# Patient Record
Sex: MALE | Race: WHITE | NOT HISPANIC OR LATINO | Employment: OTHER | URBAN - METROPOLITAN AREA
[De-identification: names, ages, dates, MRNs, and addresses within clinical notes are randomized per-mention and may not be internally consistent; named-entity substitution may affect disease eponyms.]

---

## 2018-03-21 ENCOUNTER — TELEPHONE (OUTPATIENT)
Dept: MEDICAL GROUP | Facility: LAB | Age: 38
End: 2018-03-21

## 2018-03-21 NOTE — TELEPHONE ENCOUNTER
NEW PATIENT VISIT PRE-VISIT PLANNING    1.  [employer] Patient is checked in Patient Demographics? YES    2.  Immunizations were updated in Epic using WebIZ?: No WebIZ record       •  Web Iz Recommendations:     3.  Is this appointment scheduled as a Hospital Follow-Up? No    4.  Patient is due for the following Health Maintenance Topics:   Health Maintenance Due   Topic Date Due   • IMM DTaP/Tdap/Td Vaccine (1 - Tdap) 08/06/2000   • IMM INFLUENZA (1) 09/01/2017           5.  Reviewed/Updated the following with patient:   •   Preferred Pharmacy? NO       •   Preferred Lab? NO       •   Preferred Communication? NO       •   Allergies? NO       •   Medications? NO       •   Social History? NO       •   Family History (document living status of immediate family members and if + hx of cancer, diabetes, hypertension, hyperlipidemia, heart attack, stroke) NO    6.  Updated Care Team?       •   DME Company (gait device, O2, CPAP, etc.) NO       •   Other Specialists (eye doctor, derm, GYN, cardiology, endo, etc): NO    7.  Patient was informed to arrive 15 min prior to their scheduled appointment and bring in their medication bottles.

## 2018-03-22 ENCOUNTER — OFFICE VISIT (OUTPATIENT)
Dept: MEDICAL GROUP | Facility: LAB | Age: 38
End: 2018-03-22
Payer: OTHER GOVERNMENT

## 2018-03-22 VITALS
TEMPERATURE: 97.6 F | HEART RATE: 75 BPM | BODY MASS INDEX: 34.72 KG/M2 | WEIGHT: 248 LBS | RESPIRATION RATE: 12 BRPM | SYSTOLIC BLOOD PRESSURE: 132 MMHG | OXYGEN SATURATION: 93 % | DIASTOLIC BLOOD PRESSURE: 82 MMHG | HEIGHT: 71 IN

## 2018-03-22 DIAGNOSIS — Z13.6 SCREENING FOR CARDIOVASCULAR CONDITION: ICD-10-CM

## 2018-03-22 DIAGNOSIS — J06.9 UPPER RESPIRATORY TRACT INFECTION, UNSPECIFIED TYPE: ICD-10-CM

## 2018-03-22 DIAGNOSIS — Z13.1 SCREENING FOR DIABETES MELLITUS: ICD-10-CM

## 2018-03-22 DIAGNOSIS — Z00.00 PREVENTATIVE HEALTH CARE: ICD-10-CM

## 2018-03-22 PROCEDURE — 99385 PREV VISIT NEW AGE 18-39: CPT | Performed by: PHYSICIAN ASSISTANT

## 2018-03-22 RX ORDER — CHLORAL HYDRATE 500 MG
1000 CAPSULE ORAL DAILY
COMMUNITY
Start: 2018-03-22 | End: 2021-11-22

## 2018-03-22 RX ORDER — PETROLATUM,WHITE/LANOLIN
1 OINTMENT (GRAM) TOPICAL DAILY
COMMUNITY
Start: 2018-03-22 | End: 2021-11-22

## 2018-03-22 ASSESSMENT — PATIENT HEALTH QUESTIONNAIRE - PHQ9: CLINICAL INTERPRETATION OF PHQ2 SCORE: 0

## 2018-03-22 ASSESSMENT — PAIN SCALES - GENERAL: PAINLEVEL: NO PAIN

## 2018-06-18 ENCOUNTER — APPOINTMENT (OUTPATIENT)
Dept: MEDICAL GROUP | Facility: MEDICAL CENTER | Age: 38
End: 2018-06-18
Payer: OTHER GOVERNMENT

## 2018-06-29 ENCOUNTER — OFFICE VISIT (OUTPATIENT)
Dept: MEDICAL GROUP | Facility: MEDICAL CENTER | Age: 38
End: 2018-06-29
Payer: OTHER GOVERNMENT

## 2018-06-29 VITALS
TEMPERATURE: 97.5 F | OXYGEN SATURATION: 97 % | BODY MASS INDEX: 33.12 KG/M2 | WEIGHT: 236.55 LBS | DIASTOLIC BLOOD PRESSURE: 72 MMHG | HEIGHT: 71 IN | HEART RATE: 61 BPM | SYSTOLIC BLOOD PRESSURE: 100 MMHG

## 2018-06-29 DIAGNOSIS — Z84.81 FHX: BRCA2 GENE POSITIVE: ICD-10-CM

## 2018-06-29 DIAGNOSIS — E66.9 CLASS 1 OBESITY WITHOUT SERIOUS COMORBIDITY WITH BODY MASS INDEX (BMI) OF 32.0 TO 32.9 IN ADULT, UNSPECIFIED OBESITY TYPE: ICD-10-CM

## 2018-06-29 DIAGNOSIS — Z80.3 FAMILY HISTORY OF BREAST CANCER IN MOTHER: ICD-10-CM

## 2018-06-29 DIAGNOSIS — Z00.00 PREVENTATIVE HEALTH CARE: ICD-10-CM

## 2018-06-29 DIAGNOSIS — Z76.89 ENCOUNTER TO ESTABLISH CARE: ICD-10-CM

## 2018-06-29 PROBLEM — E66.811 CLASS 1 OBESITY WITHOUT SERIOUS COMORBIDITY IN ADULT: Status: ACTIVE | Noted: 2018-06-29

## 2018-06-29 PROCEDURE — 99385 PREV VISIT NEW AGE 18-39: CPT | Performed by: PHYSICIAN ASSISTANT

## 2018-06-29 NOTE — PROGRESS NOTES
"Subjective:   Brandon Polanco is a 37 y.o. male here today for establishing care for an annual physical.    Preventative health care  This is a 37-year-old male who is here today to establish care. No complaints today. No significant medical history. He is  with 2 children. Had a vasectomy. Lives in [place]. 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 Also recently had labs ordered in March but they were not refilled. His mother  of breast cancer in her 60s. She had breast cancer diagnosed in her 40s but it came back. She tested positive for BRCA2.       Current medicines (including changes today)  Current Outpatient Prescriptions   Medication Sig Dispense Refill   • Multiple Vitamins-Minerals (MENS ONE DAILY PO) Take  by mouth.     • Omega-3 Fatty Acids (FISH OIL) 1000 MG Cap capsule Take 1 Cap by mouth every day.     • Misc Natural Products (GLUCOS-CHONDROIT-MSM COMPLEX) Tab Take 1 Tab by mouth every day.       No current facility-administered medications for this visit.      He  has no past medical history of Allergy or ASTHMA.    Social History and Family History were reviewed and updated.    ROS   No chest pain, no shortness of breath, no abdominal pain and all other systems were reviewed and are negative.       Objective:     Blood pressure 100/72, pulse 61, temperature 36.4 °C (97.5 °F), height 1.803 m (5' 11\"), weight 107.3 kg (236 lb 8.9 oz), SpO2 97 %. Body mass index is 32.99 kg/m².   Physical Exam:  Constitutional: Alert, no distress.  Skin: Warm, dry, good turgor, no rashes in visible areas.  Eye: Equal, round and reactive, conjunctiva clear, lids normal.  ENMT: Lips without lesions, good dentition, oropharynx clear.  Neck: Trachea midline, no masses.   Lymph: No cervical or supraclavicular lymphadenopathy  Respiratory: Unlabored respiratory effort, lungs clear to auscultation, no wheezes, no ronchi.  Cardiovascular: Normal S1, S2, no murmur, no " edema.  Abdomen: Soft, non-tender, no masses.  Psych: Alert and oriented x3, normal affect and mood.        Assessment and Plan:   The following treatment plan was discussed    1. Preventative health care  Generally healthy male. Ordered labs fasting.  - LIPID PROFILE; Future  - HEMOGLOBIN A1C; Future  - COMP METABOLIC PANEL; Future    2. Family history of breast cancer in mother  Referred to genetics for testing.  - REFERRAL TO GENETICS    3. FHx: BRCA2 gene positive  Referred to genetics for testing.    4. Class 1 obesity without serious comorbidity with body mass index (BMI) of 32.0 to 32.9 in adult, unspecified obesity type  Continue exercising routinely and eating healthier.  - OBESITY COUNSELING (No Charge): Patient identified as having weight management issue.  Appropriate orders and counseling given.    5. Encounter to establish care      Followup: Return in about 1 year (around 6/29/2019), or if symptoms worsen or fail to improve.    Please note that this dictation was created using voice recognition software. I have made every reasonable attempt to correct obvious errors, but I expect that there are errors of grammar and possibly content that I did not discover before finalizing the note.

## 2018-06-29 NOTE — ASSESSMENT & PLAN NOTE
This is a 37-year-old male who is here today to establish care. No complaints today. No significant medical history. He is  with 2 children. Had a vasectomy. Lives in [place]. 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 Also recently had labs ordered in March but they were not refilled. His mother  of breast cancer in her 60s. She had breast cancer diagnosed in her 40s but it came back. She tested positive for BRCA2.

## 2018-07-24 LAB
ALBUMIN SERPL-MCNC: 4.2 G/DL (ref 3.5–5.5)
ALBUMIN/GLOB SERPL: 1.5 {RATIO} (ref 1.2–2.2)
ALP SERPL-CCNC: 59 IU/L (ref 39–117)
ALT SERPL-CCNC: 18 IU/L (ref 0–44)
AST SERPL-CCNC: 22 IU/L (ref 0–40)
BILIRUB SERPL-MCNC: 0.6 MG/DL (ref 0–1.2)
BUN SERPL-MCNC: 14 MG/DL (ref 6–20)
BUN/CREAT SERPL: 12 (ref 9–20)
CALCIUM SERPL-MCNC: 9.3 MG/DL (ref 8.7–10.2)
CHLORIDE SERPL-SCNC: 102 MMOL/L (ref 96–106)
CHOLEST SERPL-MCNC: 184 MG/DL (ref 100–199)
CO2 SERPL-SCNC: 22 MMOL/L (ref 20–29)
CREAT SERPL-MCNC: 1.13 MG/DL (ref 0.76–1.27)
GLOBULIN SER CALC-MCNC: 2.8 G/DL (ref 1.5–4.5)
GLUCOSE SERPL-MCNC: 78 MG/DL (ref 65–99)
HBA1C MFR BLD: 5.5 % (ref 4.8–5.6)
HDLC SERPL-MCNC: 37 MG/DL
IF AFRICAN AMERICAN  100797: 96 ML/MIN/1.73
IF NON AFRICAN AMER 100791: 83 ML/MIN/1.73
LABORATORY COMMENT REPORT: ABNORMAL
LDLC SERPL CALC-MCNC: 99 MG/DL (ref 0–99)
POTASSIUM SERPL-SCNC: 4.2 MMOL/L (ref 3.5–5.2)
PROT SERPL-MCNC: 7 G/DL (ref 6–8.5)
SODIUM SERPL-SCNC: 140 MMOL/L (ref 134–144)
TRIGL SERPL-MCNC: 242 MG/DL (ref 0–149)
VLDLC SERPL CALC-MCNC: 48 MG/DL (ref 5–40)

## 2018-11-21 ENCOUNTER — HOSPITAL ENCOUNTER (OUTPATIENT)
Dept: RADIOLOGY | Facility: MEDICAL CENTER | Age: 38
End: 2018-11-21
Attending: INTERNAL MEDICINE
Payer: OTHER GOVERNMENT

## 2018-11-21 ENCOUNTER — OFFICE VISIT (OUTPATIENT)
Dept: INTERNAL MEDICINE | Facility: MEDICAL CENTER | Age: 38
End: 2018-11-21
Payer: OTHER GOVERNMENT

## 2018-11-21 ENCOUNTER — HOSPITAL ENCOUNTER (OUTPATIENT)
Dept: LAB | Facility: MEDICAL CENTER | Age: 38
End: 2018-11-21
Attending: INTERNAL MEDICINE
Payer: OTHER GOVERNMENT

## 2018-11-21 VITALS
HEIGHT: 71 IN | BODY MASS INDEX: 33.15 KG/M2 | SYSTOLIC BLOOD PRESSURE: 126 MMHG | WEIGHT: 236.8 LBS | DIASTOLIC BLOOD PRESSURE: 80 MMHG | RESPIRATION RATE: 20 BRPM | OXYGEN SATURATION: 96 % | HEART RATE: 72 BPM | TEMPERATURE: 97 F

## 2018-11-21 DIAGNOSIS — Z23 NEED FOR VACCINATION: ICD-10-CM

## 2018-11-21 DIAGNOSIS — S99.921A FOOT INJURY, RIGHT, INITIAL ENCOUNTER: ICD-10-CM

## 2018-11-21 LAB — URATE SERPL-MCNC: 6.1 MG/DL (ref 2.5–8.3)

## 2018-11-21 PROCEDURE — 90686 IIV4 VACC NO PRSV 0.5 ML IM: CPT | Performed by: INTERNAL MEDICINE

## 2018-11-21 PROCEDURE — 90471 IMMUNIZATION ADMIN: CPT | Performed by: INTERNAL MEDICINE

## 2018-11-21 PROCEDURE — 36415 COLL VENOUS BLD VENIPUNCTURE: CPT

## 2018-11-21 PROCEDURE — 73630 X-RAY EXAM OF FOOT: CPT | Mod: RT

## 2018-11-21 PROCEDURE — 84550 ASSAY OF BLOOD/URIC ACID: CPT

## 2018-11-21 PROCEDURE — 99213 OFFICE O/P EST LOW 20 MIN: CPT | Mod: 25 | Performed by: INTERNAL MEDICINE

## 2018-11-21 ASSESSMENT — PAIN SCALES - GENERAL: PAINLEVEL: 8=MODERATE-SEVERE PAIN

## 2018-11-21 NOTE — NON-PROVIDER
"Brandon Polanco is a 38 y.o. male here for a non-provider visit for:   FLU    Reason for immunization: Annual Flu Vaccine  Immunization records indicate need for vaccine: Yes, confirmed with [place] WebIZ  Minimum interval has been met for this vaccine: Yes  ABN completed: Not Indicated    Order and dose verified by: GP  VIS Dated  08/07 was given to patient: Yes  All IAC Questionnaire questions were answered \"No.\"    Patient tolerated injection and no adverse effects were observed or reported: Yes    Pt scheduled for next dose in series: No  "

## 2018-11-21 NOTE — PATIENT INSTRUCTIONS
Foot Sprain  Introduction  A foot sprain is an injury to one of the strong bands of tissue (ligaments) that connect and support the many bones in your feet. The ligament can be stretched too much or it can tear. A tear can be either partial or complete. The severity of the sprain depends on how much of the ligament was damaged or torn.  What are the causes?  A foot sprain is usually caused by suddenly twisting or pivoting your foot.  What increases the risk?  This injury is more likely to occur in people who:  · Play a sport, such as basketball or football.  · Exercise or play a sport without warming up.  · Start a new workout or sport.  · Suddenly increase how long or hard they exercise or play a sport.  What are the signs or symptoms?  Symptoms of this condition start soon after an injury and include:  · Pain, especially in the arch of the foot.  · Bruising.  · Swelling.  · Inability to walk or use the foot to support body weight.  How is this diagnosed?  This condition is diagnosed with a medical history and physical exam. You may also have imaging tests, such as:  · X-rays to make sure there are no broken bones (fractures).  · MRI to see if the ligament has torn.  How is this treated?  Treatment varies depending on the severity of your sprain. Mild sprains can be treated with rest, ice, compression, and elevation (RICE). If your ligament is overstretched or partially torn, treatment usually involves keeping your foot in a fixed position (immobilization) for a period of time. To help you do this, your health care provider will apply a bandage, splint, or walking boot to keep your foot from moving until it heals. You may also be advised to use crutches or a scooter for a few weeks to avoid bearing weight on your foot while it is healing.  If your ligament is fully torn, you may need surgery to reconnect the ligament to the bone. After surgery, a cast or splint will be applied and will need to stay on your foot  while it heals.  Your health care provider may also suggest exercises or physical therapy to strengthen your foot.  Follow these instructions at home:  If You Have a Bandage, Splint, or Walking Boot:  · Wear it as directed by your health care provider. Remove it only as directed by your health care provider.  · Loosen the bandage, splint, or walking boot if your toes become numb and tingle, or if they turn cold and blue.  Bathing  · If your health care provider approves bathing and showering, cover the bandage or splint with a watertight plastic bag to protect it from water. Do not let the bandage or splint get wet.  Managing pain, stiffness, and swelling  · If directed, apply ice to the injured area:  ¨ Put ice in a plastic bag.  ¨ Place a towel between your skin and the bag.  ¨ Leave the ice on for 20 minutes, 2-3 times per day.  · Move your toes often to avoid stiffness and to lessen swelling.  · Raise (elevate) the injured area above the level of your heart while you are sitting or lying down.  Driving  · Do not drive or operate heavy machinery while taking pain medicine.  · Ask your health care provider when it is safe to drive if you have a bandage, splint, or walking boot on your foot.  Activity  · Rest as directed by your health care provider.  · Do not use the injured foot to support your body weight until your health care provider says that you can. Use crutches or other supportive devices as directed by your health care provider.  · Ask your health care provider what activities are safe for you. Gradually increase how much and how far you walk until your health care provider says it is safe to return to full activity.  · Do any exercise or physical therapy as directed by your health care provider.  General instructions  · If a splint was applied, do not put pressure on any part of it until it is fully hardened. This may take several hours.  · Take medicines only as directed by your health care provider.  These include over-the-counter medicines and prescription medicines.  · Keep all follow-up visits as directed by your health care provider. This is important.  · When you can walk without pain, wear supportive shoes that have stiff soles. Do not wear flip-flops, and do not walk barefoot.  Contact a health care provider if:  · Your pain is not controlled with medicine.  · Your bruising or swelling gets worse or does not get better with treatment.  · Your splint or walking boot is damaged.  Get help right away if:  · You develop severe numbness or tingling in your foot.  · Your foot turns blue, white, or gray, and it feels cold.  This information is not intended to replace advice given to you by your health care provider. Make sure you discuss any questions you have with your health care provider.  Document Released: 06/09/2003 Document Revised: 05/25/2017 Document Reviewed: 10/21/2015  © 2017 [employer]vier

## 2018-11-21 NOTE — LETTER
November 21, 2018        Brandon Polanco  [address]  [facility] [address]      To Whom so it may concern,    Mr. Taylor is seen in our office today for foot injury and will need off heavy duty exercise for a week. Recommend foot elevation and compression during this period.      If you have any questions or concerns, please don't hesitate to call.        Sincerely,        Michelle Brewster M.D.    Electronically Signed

## 2018-11-21 NOTE — PROGRESS NOTES
date  Chief Complaint   Patient presents with   • Foot Problem     swollen, red patches x 2 days        HISTORY OF PRESENT ILLNESS: Patient is a 38 y.o. male established patient who presents today for the following.      1. Need for vaccination  Interested in getting a flu shot today    2. Foot injury, right, initial encounter  Complaint of right foot injury 5 days ago .he went [place] driving at [facility] in [place] and on the sand tripped and fell with no initial injury or pain but later noticed right foot pain and swelling a day later.  Has been taking ibuprofen 800 mg TID and some relief.  Concerned if it is any fracture and so here for evaluation  Upon further review of history he does eat a lot of meat and has protein supplements for breakfast as he exercises a lot and works in the [employer].  He also drinks 1 glass of whiskey every night with dinner      History reviewed. No pertinent past medical history.    Patient Active Problem List    Diagnosis Date Noted   • Family history of breast cancer in mother 06/29/2018   • FHx: BRCA2 gene positive 06/29/2018   • Class 1 obesity without serious comorbidity in adult 06/29/2018       Allergies:Patient has no known allergies.    Current Outpatient Prescriptions   Medication Sig Dispense Refill   • Multiple Vitamins-Minerals (MENS ONE DAILY PO) Take  by mouth.     • Omega-3 Fatty Acids (FISH OIL) 1000 MG Cap capsule Take 1 Cap by mouth every day.     • Misc Natural Products (GLUCOS-CHONDROIT-MSM COMPLEX) Tab Take 1 Tab by mouth every day.       No current facility-administered medications for this visit.        Social History   Substance Use Topics   • Smoking status: Never Smoker   • Smokeless tobacco: Never Used   • Alcohol use 0.0 oz/week      Comment: 1 glass whiskey with dinner 6 d/wk       Family History   Problem Relation Age of Onset   • Cancer Mother         breast- met   • Heart Disease Maternal Grandmother    • Heart Disease Maternal Grandfather    • Heart  "Disease Paternal Grandmother    • Heart Disease Paternal Grandfather    • Diabetes Neg Hx    • Stroke Neg Hx          Review of Systems    Patient denies Fevers/chills/nausea/vomiting/chest pain/sob/blood in stools/black stools/blood in urine.all other systems are reviewed See HPI    Exam:  Blood pressure 126/80, pulse 72, temperature 36.1 °C (97 °F), temperature source Temporal, resp. rate 20, height 1.803 m (5' 10.98\"), weight 107.4 kg (236 lb 12.8 oz), SpO2 96 %. Body mass index is 33.04 kg/m².  Constitutional:  NAD, well appearing.  HEENT:   NC/AT  Extremities:  2+ DP and radial pulses bilaterally.  No c/c/e.  There is tenderness at first MTP joint on the right foot as well as the little toe.  He also has some pain midfoot on the medial aspect as well as at the lateral aspect.  There is no pain on the dorsal aspect of the foot.  Range of motion pretty good in the rest of the toes  Skin:  Warm and dry.    Neurologic: Alert & oriented x 3, CN II-XII grossly intact, strength and sensation grossly intact.  No focal deficits noted.  Psychiatric:  Affect normal, mood normal, judgment normal.    Assessment/Plan:     1. Need for vaccination  Flu shot given today in the office  - Influenza Vaccine Quad Injection >3Y (PF)    2. Foot injury, right, initial encounter  Patient seems to have had sprain during the fall.  We will get an x-ray just to make sure that there is no minor high hairline fractures secondary to his pain and swelling.  Discussed about rest, ice, compression, elevation.  Given his history of lot of meat as well as whiskey intake we talked about getting uric acid level and also may be consider switching to wine in place of whiskey and limit to only 1 glass as he is risk for gout attacks.    - DX-FOOT-COMPLETE 3+ RIGHT; Future  - URIC ACID; Future      All imaging results and lab results and consult notes are reviewed at this visit.  Followup: Return if symptoms worsen or fail to improve.    Please note " that this dictation was created using voice recognition software. I have made every reasonable attempt to correct obvious errors, but I expect that there are errors of grammar and possibly content that I did not discover before finalizing the note.

## 2019-01-29 NOTE — PROGRESS NOTES
HPI:   Brandon Polanco is a 37 y.o. male here for annual visit as new pt.     utd on all immunizations since he is in the [employer].   Diet: tried to eat healthy  Exercise: most days of the week  etoh- 1 whiskey drink 6 nights/wk with dinner.     For less than 2 weeks pt has nasal congestion, ear popping, frontal sinus pressure intermittently (none today), very slight cough  Denies fever, chills, headache, ear pain or pressure, facial pain, sore throat, shortness of breath, wheezing, chest pain, abdominal discomfort, nausea, vomiting, muscle aches  Has not been treating with anything  States it feels very mild and symptoms are intermittent.   Does get allergies although this is not typically is allergy season. Has used Flonase in the past.   No tobacco use or respiratory issues.  Does have 2 daughters and one of them was recently sick as well.    Current medicines (including changes today)  Current Outpatient Prescriptions   Medication Sig Dispense Refill   • Multiple Vitamins-Minerals (MENS ONE DAILY PO) Take  by mouth.     • Omega-3 Fatty Acids (FISH OIL) 1000 MG Cap capsule Take 1 Cap by mouth every day.     • Misc Natural Products (GLUCOS-CHONDROIT-MSM COMPLEX) Tab Take 1 Tab by mouth every day.       No current facility-administered medications for this visit.      He  has no past medical history of Allergy or ASTHMA.  He  has a past surgical history that includes eye surgery and vasectomy.  Social History   Substance Use Topics   • Smoking status: Never Smoker   • Smokeless tobacco: Never Used   • Alcohol use 0.0 oz/week      Comment: 1 glass whiskey with dinner 6 d/wk     Social History     Social History Narrative   • No narrative on file     Family History   Problem Relation Age of Onset   • Cancer Mother      breast- met   • Heart Disease Maternal Grandmother    • Heart Disease Maternal Grandfather    • Heart Disease Paternal Grandmother    • Heart Disease Paternal Grandfather    • Diabetes Neg Hx    •  "Stroke Neg Hx      Family Status   Relation Status   • Mother    • Father    • Brother Alive   • Maternal Grandmother    • Maternal Grandfather    • Paternal Grandmother    • Paternal Grandfather    • Daughter Alive   • Daughter Alive   • Neg Hx        ROS  Constitutional: Negative for fever, chills, weight loss  HENT: Negative for ear pain, nosebleeds, + congestion, neg sore throat and neck pain.   Eyes: Negative for blurred vision.   Respiratory: + cough, sputum production, Negative for shortness of breath and wheezing.   Cardiovascular: Negative for chest pain, palpitations, orthopnea and leg swelling.   Gastrointestinal: Negative for heartburn, nausea, vomiting and abdominal pain.   Genitourinary: Negative for dysuria, urgency and frequency.   Musculoskeletal: Negative for myalgias, back pain and joint pain.   Skin: Negative for rash and itching.   Neurological: Negative for dizziness, tingling, tremors, sensory change, focal weakness and headaches.   Endo/Heme/Allergies: Does not bruise/bleed easily.   Psychiatric/Behavioral: Negative for depression, anxiety, or memory loss.   All other systems reviewed and are negative except as in HPI.     Objective:     Blood pressure 132/82, pulse 75, temperature 36.4 °C (97.6 °F), resp. rate 12, height 1.803 m (5' 11\"), weight 112.5 kg (248 lb), SpO2 93 %. Body mass index is 34.59 kg/m².  Physical Exam:    Constitutional: Alert, no distress.  Skin: Warm, dry, good turgor, no rashes in visible areas.  Eye: PERRLA, conjunctiva clear, lids normal.  ENMT: TM pearly gray, nares patent, nasal terminates just slightly edematous with clear mucus noted. Lips without lesions, good dentition, oropharynx with very mild erythema, no exudates or lesions, clear postnasal drip mild.  Neck: Trachea midline, no masses, no thyromegaly.  Respiratory: Unlabored respiratory effort, lungs clear to auscultation, no wheezes, no ronchi.  Cardiovascular: Normal S1, S2, no murmur, no " edema.  Abdomen: Soft, non-tender, no masses, no hepatosplenomegaly.  Psych: Alert and oriented x3, normal affect and mood.    Assessment and Plan:   The following treatment plan was discussed     1. Preventative health care  utd on immunizations, pt will bring in record  Labs ordered, will call for results    2. Screening for cardiovascular condition  Labs ordered, will call for results  - LIPID PROFILE; Future    3. Screening for diabetes mellitus  Labs ordered, will call for results  - COMP METABOLIC PANEL; Future    4. BMI 34.0-34.9,adult  continue with healthy lifestyle, recommend a little more cardio.   - Patient identified as having weight management issue.  Appropriate orders and counseling given.    5. URI  Viral most likely, recommend nasal saline spray, Flonase, Mucinex with lots of water, rest.   Follow back up or call if symptoms do not improve or worsen    Records requested.  Followup: Return in about 1 year (around 3/22/2019) for annual .           Please note that this dictation was created using voice recognition software. I have made every reasonable attempt to correct obvious errors, but I expect that there are errors of grammar and possibly content that I did not discover before finalizing the note.   No

## 2021-09-24 ENCOUNTER — OFFICE VISIT (OUTPATIENT)
Dept: MEDICAL GROUP | Facility: MEDICAL CENTER | Age: 41
End: 2021-09-24
Payer: OTHER GOVERNMENT

## 2021-09-24 VITALS
WEIGHT: 239.2 LBS | OXYGEN SATURATION: 97 % | DIASTOLIC BLOOD PRESSURE: 82 MMHG | SYSTOLIC BLOOD PRESSURE: 114 MMHG | BODY MASS INDEX: 34.24 KG/M2 | HEART RATE: 70 BPM | HEIGHT: 70 IN | TEMPERATURE: 97.8 F

## 2021-09-24 DIAGNOSIS — Z00.00 PREVENTATIVE HEALTH CARE: ICD-10-CM

## 2021-09-24 DIAGNOSIS — J01.10 ACUTE NON-RECURRENT FRONTAL SINUSITIS: ICD-10-CM

## 2021-09-24 DIAGNOSIS — E66.9 OBESITY (BMI 30-39.9): ICD-10-CM

## 2021-09-24 PROBLEM — E66.811 CLASS 1 OBESITY WITHOUT SERIOUS COMORBIDITY IN ADULT: Status: RESOLVED | Noted: 2018-06-29 | Resolved: 2021-09-24

## 2021-09-24 PROBLEM — J32.1 FRONTAL SINUSITIS: Status: ACTIVE | Noted: 2021-09-24

## 2021-09-24 PROCEDURE — 99214 OFFICE O/P EST MOD 30 MIN: CPT | Performed by: PHYSICIAN ASSISTANT

## 2021-09-24 RX ORDER — SULFAMETHOXAZOLE AND TRIMETHOPRIM 800; 160 MG/1; MG/1
1 TABLET ORAL 2 TIMES DAILY
Qty: 20 TABLET | Refills: 0 | Status: SHIPPED
Start: 2021-09-24 | End: 2021-11-22

## 2021-09-24 ASSESSMENT — PATIENT HEALTH QUESTIONNAIRE - PHQ9: CLINICAL INTERPRETATION OF PHQ2 SCORE: 0

## 2021-09-24 NOTE — PROGRESS NOTES
"Subjective:   Brandon Polanco is a 41 y.o. male here today for acute sinusitis.    Frontal sinusitis  This is a pleasant 41-year-old male complains of an approximate 10-day history of sinus congestion and drainage.  Symptoms began with left ear pain.  Then moved into his sinuses.  He is blowing brownish chunks of mucus.  Denies any fevers.  Wife was sick the week before.  No known Covid exposure.  He has been taking over-the-counter cold medications as well as Benadryl.  No improvement in his symptoms.  Slight coughing.  Have not seen in quite some time so he is due for labs.       Current medicines (including changes today)  Current Outpatient Medications   Medication Sig Dispense Refill   • sulfamethoxazole-trimethoprim (BACTRIM DS) 800-160 MG tablet Take 1 Tablet by mouth 2 times a day. 20 Tablet 0   • Multiple Vitamins-Minerals (MENS ONE DAILY PO) Take  by mouth.     • Omega-3 Fatty Acids (FISH OIL) 1000 MG Cap capsule Take 1 Cap by mouth every day.     • Misc Natural Products (GLUCOS-CHONDROIT-MSM COMPLEX) Tab Take 1 Tab by mouth every day.       No current facility-administered medications for this visit.     He  has no past medical history of Allergy or ASTHMA.    Social History and Family History were reviewed and updated.    ROS   No chest pain, no shortness of breath, no abdominal pain and all other systems were reviewed and are negative.       Objective:     /82   Pulse 70   Temp 36.6 °C (97.8 °F) (Temporal)   Ht 1.778 m (5' 10\")   Wt 109 kg (239 lb 3.2 oz)   SpO2 97%  Body mass index is 34.32 kg/m².   Physical Exam:  Constitutional: Alert, no distress.  Skin: Warm, dry, good turgor, no rashes in visible areas.  Eye: Equal, round and reactive, conjunctiva clear, lids normal.  ENMT: Lips without lesions, good dentition, oropharynx clear.  Left TM is clear.  Neck: Trachea midline, no masses.   Lymph: No cervical or supraclavicular lymphadenopathy  Respiratory: Unlabored respiratory effort, lungs " appear clear, no wheezes.  Cardiovascular: Regular rate and rhythm.  Psych: Alert and oriented x3, normal affect and mood.        Assessment and Plan:   The following treatment plan was discussed    1. Acute non-recurrent frontal sinusitis  , New onset condition.  Discussed likely viral process.  May continue over-the-counter cold medications.  Advise possibly nasal steroid spray like Nasacort.  Sent over Bactrim which may reduce bacterial properties.  Push fluids.  Hopefully his symptoms will improve in 5 to 10 days.  - sulfamethoxazole-trimethoprim (BACTRIM DS) 800-160 MG tablet; Take 1 Tablet by mouth 2 times a day.  Dispense: 20 Tablet; Refill: 0    2. Preventative health care  Ordered labs fasting.  He will be contacted with the results.  - TSH WITH REFLEX TO FT4; Future  - Comp Metabolic Panel; Future  - HEMOGLOBIN A1C; Future  - Lipid Profile; Future  - CBC WITH DIFFERENTIAL; Future  - VITAMIN D,25 HYDROXY; Future         Followup: Return if symptoms worsen or fail to improve.    Please note that this dictation was created using voice recognition software. I have made every reasonable attempt to correct obvious errors, but I expect that there are errors of grammar and possibly content that I did not discover before finalizing the note.

## 2021-09-24 NOTE — ASSESSMENT & PLAN NOTE
This is a pleasant 41-year-old male complains of an approximate 10-day history of sinus congestion and drainage.  Symptoms began with left ear pain.  Then moved into his sinuses.  He is blowing brownish chunks of mucus.  Denies any fevers.  Wife was sick the week before.  No known Covid exposure.  He has been taking over-the-counter cold medications as well as Benadryl.  No improvement in his symptoms.  Slight coughing.  Have not seen in quite some time so he is due for labs.

## 2021-11-18 ENCOUNTER — HOSPITAL ENCOUNTER (OUTPATIENT)
Dept: LAB | Facility: MEDICAL CENTER | Age: 41
End: 2021-11-18
Attending: PHYSICIAN ASSISTANT
Payer: OTHER GOVERNMENT

## 2021-11-18 DIAGNOSIS — Z00.00 PREVENTATIVE HEALTH CARE: ICD-10-CM

## 2021-11-18 LAB
ALBUMIN SERPL BCP-MCNC: 4.6 G/DL (ref 3.2–4.9)
ALBUMIN/GLOB SERPL: 1.6 G/DL
ALP SERPL-CCNC: 69 U/L (ref 30–99)
ALT SERPL-CCNC: 23 U/L (ref 2–50)
ANION GAP SERPL CALC-SCNC: 13 MMOL/L (ref 7–16)
AST SERPL-CCNC: 18 U/L (ref 12–45)
BASOPHILS # BLD AUTO: 1 % (ref 0–1.8)
BASOPHILS # BLD: 0.08 K/UL (ref 0–0.12)
BILIRUB SERPL-MCNC: 0.4 MG/DL (ref 0.1–1.5)
BUN SERPL-MCNC: 20 MG/DL (ref 8–22)
CALCIUM SERPL-MCNC: 9.6 MG/DL (ref 8.5–10.5)
CHLORIDE SERPL-SCNC: 105 MMOL/L (ref 96–112)
CHOLEST SERPL-MCNC: 201 MG/DL (ref 100–199)
CO2 SERPL-SCNC: 23 MMOL/L (ref 20–33)
CREAT SERPL-MCNC: 1.23 MG/DL (ref 0.5–1.4)
EOSINOPHIL # BLD AUTO: 0.21 K/UL (ref 0–0.51)
EOSINOPHIL NFR BLD: 2.5 % (ref 0–6.9)
ERYTHROCYTE [DISTWIDTH] IN BLOOD BY AUTOMATED COUNT: 39.4 FL (ref 35.9–50)
EST. AVERAGE GLUCOSE BLD GHB EST-MCNC: 117 MG/DL
FASTING STATUS PATIENT QL REPORTED: NORMAL
GLOBULIN SER CALC-MCNC: 2.9 G/DL (ref 1.9–3.5)
GLUCOSE SERPL-MCNC: 95 MG/DL (ref 65–99)
HBA1C MFR BLD: 5.7 % (ref 4–5.6)
HCT VFR BLD AUTO: 49.7 % (ref 42–52)
HDLC SERPL-MCNC: 42 MG/DL
HGB BLD-MCNC: 16.5 G/DL (ref 14–18)
IMM GRANULOCYTES # BLD AUTO: 0.01 K/UL (ref 0–0.11)
IMM GRANULOCYTES NFR BLD AUTO: 0.1 % (ref 0–0.9)
LDLC SERPL CALC-MCNC: 138 MG/DL
LYMPHOCYTES # BLD AUTO: 2.58 K/UL (ref 1–4.8)
LYMPHOCYTES NFR BLD: 31.3 % (ref 22–41)
MCH RBC QN AUTO: 29.8 PG (ref 27–33)
MCHC RBC AUTO-ENTMCNC: 33.2 G/DL (ref 33.7–35.3)
MCV RBC AUTO: 89.7 FL (ref 81.4–97.8)
MONOCYTES # BLD AUTO: 1.02 K/UL (ref 0–0.85)
MONOCYTES NFR BLD AUTO: 12.4 % (ref 0–13.4)
NEUTROPHILS # BLD AUTO: 4.34 K/UL (ref 1.82–7.42)
NEUTROPHILS NFR BLD: 52.7 % (ref 44–72)
NRBC # BLD AUTO: 0 K/UL
NRBC BLD-RTO: 0 /100 WBC
PLATELET # BLD AUTO: 201 K/UL (ref 164–446)
PMV BLD AUTO: 13.3 FL (ref 9–12.9)
POTASSIUM SERPL-SCNC: 4.6 MMOL/L (ref 3.6–5.5)
PROT SERPL-MCNC: 7.5 G/DL (ref 6–8.2)
RBC # BLD AUTO: 5.54 M/UL (ref 4.7–6.1)
SODIUM SERPL-SCNC: 141 MMOL/L (ref 135–145)
TRIGL SERPL-MCNC: 107 MG/DL (ref 0–149)
TSH SERPL DL<=0.005 MIU/L-ACNC: 3.23 UIU/ML (ref 0.38–5.33)
WBC # BLD AUTO: 8.2 K/UL (ref 4.8–10.8)

## 2021-11-18 PROCEDURE — 80061 LIPID PANEL: CPT

## 2021-11-18 PROCEDURE — 36415 COLL VENOUS BLD VENIPUNCTURE: CPT

## 2021-11-18 PROCEDURE — 85025 COMPLETE CBC W/AUTO DIFF WBC: CPT

## 2021-11-18 PROCEDURE — 84443 ASSAY THYROID STIM HORMONE: CPT

## 2021-11-18 PROCEDURE — 83036 HEMOGLOBIN GLYCOSYLATED A1C: CPT

## 2021-11-18 PROCEDURE — 80053 COMPREHEN METABOLIC PANEL: CPT

## 2021-11-18 PROCEDURE — 82306 VITAMIN D 25 HYDROXY: CPT

## 2021-11-20 LAB — 25(OH)D3 SERPL-MCNC: 23 NG/ML (ref 30–80)

## 2021-11-22 ENCOUNTER — APPOINTMENT (OUTPATIENT)
Dept: RADIOLOGY | Facility: MEDICAL CENTER | Age: 41
DRG: 605 | End: 2021-11-22
Attending: EMERGENCY MEDICINE
Payer: OTHER GOVERNMENT

## 2021-11-22 ENCOUNTER — HOSPITAL ENCOUNTER (INPATIENT)
Facility: MEDICAL CENTER | Age: 41
LOS: 2 days | DRG: 605 | End: 2021-11-24
Attending: EMERGENCY MEDICINE | Admitting: INTERNAL MEDICINE
Payer: OTHER GOVERNMENT

## 2021-11-22 ENCOUNTER — OFFICE VISIT (OUTPATIENT)
Dept: URGENT CARE | Facility: CLINIC | Age: 41
End: 2021-11-22
Payer: OTHER GOVERNMENT

## 2021-11-22 VITALS
HEIGHT: 70 IN | OXYGEN SATURATION: 97 % | WEIGHT: 238 LBS | HEART RATE: 82 BPM | BODY MASS INDEX: 34.07 KG/M2 | RESPIRATION RATE: 16 BRPM | TEMPERATURE: 97.6 F

## 2021-11-22 DIAGNOSIS — L08.9 PUNCTURE WOUND OF RIGHT HAND WITH INFECTION, INITIAL ENCOUNTER: ICD-10-CM

## 2021-11-22 DIAGNOSIS — L03.90 CELLULITIS, UNSPECIFIED CELLULITIS SITE: ICD-10-CM

## 2021-11-22 DIAGNOSIS — S61.431A PUNCTURE WOUND OF RIGHT HAND WITH INFECTION, INITIAL ENCOUNTER: ICD-10-CM

## 2021-11-22 DIAGNOSIS — T14.8XXA PUNCTURE WOUND: ICD-10-CM

## 2021-11-22 PROBLEM — R73.03 PREDIABETES: Status: ACTIVE | Noted: 2021-11-22

## 2021-11-22 PROBLEM — D72.829 LEUKOCYTOSIS: Status: ACTIVE | Noted: 2021-11-22

## 2021-11-22 LAB
ALBUMIN SERPL BCP-MCNC: 4.8 G/DL (ref 3.2–4.9)
ALBUMIN/GLOB SERPL: 2 G/DL
ALP SERPL-CCNC: 82 U/L (ref 30–99)
ALT SERPL-CCNC: 28 U/L (ref 2–50)
ANION GAP SERPL CALC-SCNC: 13 MMOL/L (ref 7–16)
AST SERPL-CCNC: 24 U/L (ref 12–45)
BASOPHILS # BLD AUTO: 0.5 % (ref 0–1.8)
BASOPHILS # BLD: 0.07 K/UL (ref 0–0.12)
BILIRUB SERPL-MCNC: 0.6 MG/DL (ref 0.1–1.5)
BUN SERPL-MCNC: 18 MG/DL (ref 8–22)
CALCIUM SERPL-MCNC: 9.8 MG/DL (ref 8.4–10.2)
CHLORIDE SERPL-SCNC: 101 MMOL/L (ref 96–112)
CO2 SERPL-SCNC: 24 MMOL/L (ref 20–33)
CREAT SERPL-MCNC: 1.17 MG/DL (ref 0.5–1.4)
EOSINOPHIL # BLD AUTO: 0.12 K/UL (ref 0–0.51)
EOSINOPHIL NFR BLD: 0.8 % (ref 0–6.9)
ERYTHROCYTE [DISTWIDTH] IN BLOOD BY AUTOMATED COUNT: 37.7 FL (ref 35.9–50)
EST. AVERAGE GLUCOSE BLD GHB EST-MCNC: 120 MG/DL
GLOBULIN SER CALC-MCNC: 2.4 G/DL (ref 1.9–3.5)
GLUCOSE SERPL-MCNC: 117 MG/DL (ref 65–99)
HBA1C MFR BLD: 5.8 % (ref 4–5.6)
HCT VFR BLD AUTO: 50.7 % (ref 42–52)
HGB BLD-MCNC: 17.3 G/DL (ref 14–18)
IMM GRANULOCYTES # BLD AUTO: 0.09 K/UL (ref 0–0.11)
IMM GRANULOCYTES NFR BLD AUTO: 0.6 % (ref 0–0.9)
LYMPHOCYTES # BLD AUTO: 2.03 K/UL (ref 1–4.8)
LYMPHOCYTES NFR BLD: 13.6 % (ref 22–41)
MCH RBC QN AUTO: 29.8 PG (ref 27–33)
MCHC RBC AUTO-ENTMCNC: 34.1 G/DL (ref 33.7–35.3)
MCV RBC AUTO: 87.3 FL (ref 81.4–97.8)
MONOCYTES # BLD AUTO: 1.36 K/UL (ref 0–0.85)
MONOCYTES NFR BLD AUTO: 9.1 % (ref 0–13.4)
NEUTROPHILS # BLD AUTO: 11.22 K/UL (ref 1.82–7.42)
NEUTROPHILS NFR BLD: 75.4 % (ref 44–72)
NRBC # BLD AUTO: 0 K/UL
NRBC BLD-RTO: 0 /100 WBC
PLATELET # BLD AUTO: 194 K/UL (ref 164–446)
PMV BLD AUTO: 12.1 FL (ref 9–12.9)
POTASSIUM SERPL-SCNC: 4 MMOL/L (ref 3.6–5.5)
PROT SERPL-MCNC: 7.2 G/DL (ref 6–8.2)
RBC # BLD AUTO: 5.81 M/UL (ref 4.7–6.1)
SODIUM SERPL-SCNC: 138 MMOL/L (ref 135–145)
WBC # BLD AUTO: 14.9 K/UL (ref 4.8–10.8)

## 2021-11-22 PROCEDURE — 36415 COLL VENOUS BLD VENIPUNCTURE: CPT

## 2021-11-22 PROCEDURE — 87040 BLOOD CULTURE FOR BACTERIA: CPT

## 2021-11-22 PROCEDURE — 99233 SBSQ HOSP IP/OBS HIGH 50: CPT | Performed by: INTERNAL MEDICINE

## 2021-11-22 PROCEDURE — 700105 HCHG RX REV CODE 258: Performed by: EMERGENCY MEDICINE

## 2021-11-22 PROCEDURE — 700111 HCHG RX REV CODE 636 W/ 250 OVERRIDE (IP): Performed by: INTERNAL MEDICINE

## 2021-11-22 PROCEDURE — 700105 HCHG RX REV CODE 258: Performed by: INTERNAL MEDICINE

## 2021-11-22 PROCEDURE — 3E0934Z INTRODUCTION OF SERUM, TOXOID AND VACCINE INTO NOSE, PERCUTANEOUS APPROACH: ICD-10-PCS | Performed by: EMERGENCY MEDICINE

## 2021-11-22 PROCEDURE — 99214 OFFICE O/P EST MOD 30 MIN: CPT | Performed by: FAMILY MEDICINE

## 2021-11-22 PROCEDURE — 80053 COMPREHEN METABOLIC PANEL: CPT

## 2021-11-22 PROCEDURE — 90715 TDAP VACCINE 7 YRS/> IM: CPT | Performed by: EMERGENCY MEDICINE

## 2021-11-22 PROCEDURE — 90471 IMMUNIZATION ADMIN: CPT

## 2021-11-22 PROCEDURE — 700102 HCHG RX REV CODE 250 W/ 637 OVERRIDE(OP): Performed by: INTERNAL MEDICINE

## 2021-11-22 PROCEDURE — A9270 NON-COVERED ITEM OR SERVICE: HCPCS | Performed by: INTERNAL MEDICINE

## 2021-11-22 PROCEDURE — 96365 THER/PROPH/DIAG IV INF INIT: CPT

## 2021-11-22 PROCEDURE — 73130 X-RAY EXAM OF HAND: CPT | Mod: RT

## 2021-11-22 PROCEDURE — 99285 EMERGENCY DEPT VISIT HI MDM: CPT

## 2021-11-22 PROCEDURE — 83036 HEMOGLOBIN GLYCOSYLATED A1C: CPT

## 2021-11-22 PROCEDURE — 700111 HCHG RX REV CODE 636 W/ 250 OVERRIDE (IP): Performed by: EMERGENCY MEDICINE

## 2021-11-22 PROCEDURE — 85025 COMPLETE CBC W/AUTO DIFF WBC: CPT

## 2021-11-22 PROCEDURE — 770006 HCHG ROOM/CARE - MED/SURG/GYN SEMI*

## 2021-11-22 RX ORDER — BISACODYL 10 MG
10 SUPPOSITORY, RECTAL RECTAL
Status: DISCONTINUED | OUTPATIENT
Start: 2021-11-22 | End: 2021-11-24 | Stop reason: HOSPADM

## 2021-11-22 RX ORDER — AMOXICILLIN 250 MG
2 CAPSULE ORAL 2 TIMES DAILY
Status: DISCONTINUED | OUTPATIENT
Start: 2021-11-22 | End: 2021-11-24 | Stop reason: HOSPADM

## 2021-11-22 RX ORDER — IBUPROFEN 200 MG
800 TABLET ORAL EVERY 6 HOURS PRN
COMMUNITY
End: 2021-12-01

## 2021-11-22 RX ORDER — OXYCODONE HYDROCHLORIDE 5 MG/1
2.5 TABLET ORAL
Status: DISCONTINUED | OUTPATIENT
Start: 2021-11-22 | End: 2021-11-24 | Stop reason: HOSPADM

## 2021-11-22 RX ORDER — OXYCODONE HYDROCHLORIDE 5 MG/1
5 TABLET ORAL
Status: DISCONTINUED | OUTPATIENT
Start: 2021-11-22 | End: 2021-11-24 | Stop reason: HOSPADM

## 2021-11-22 RX ORDER — POLYETHYLENE GLYCOL 3350 17 G/17G
1 POWDER, FOR SOLUTION ORAL
Status: DISCONTINUED | OUTPATIENT
Start: 2021-11-22 | End: 2021-11-24 | Stop reason: HOSPADM

## 2021-11-22 RX ORDER — COVID-19 MOLECULAR TEST ASSAY
KIT MISCELLANEOUS
COMMUNITY
Start: 2021-10-09 | End: 2021-11-22

## 2021-11-22 RX ORDER — ACETAMINOPHEN 325 MG/1
650 TABLET ORAL EVERY 6 HOURS PRN
Status: DISCONTINUED | OUTPATIENT
Start: 2021-11-22 | End: 2021-11-24 | Stop reason: HOSPADM

## 2021-11-22 RX ORDER — HYDROMORPHONE HYDROCHLORIDE 1 MG/ML
0.25 INJECTION, SOLUTION INTRAMUSCULAR; INTRAVENOUS; SUBCUTANEOUS
Status: DISCONTINUED | OUTPATIENT
Start: 2021-11-22 | End: 2021-11-24 | Stop reason: HOSPADM

## 2021-11-22 RX ADMIN — AMPICILLIN AND SULBACTAM 3 G: 2; 1 INJECTION, POWDER, FOR SOLUTION INTRAVENOUS at 18:28

## 2021-11-22 RX ADMIN — ACETAMINOPHEN 650 MG: 325 TABLET ORAL at 16:24

## 2021-11-22 RX ADMIN — CLOSTRIDIUM TETANI TOXOID ANTIGEN (FORMALDEHYDE INACTIVATED), CORYNEBACTERIUM DIPHTHERIAE TOXOID ANTIGEN (FORMALDEHYDE INACTIVATED), BORDETELLA PERTUSSIS TOXOID ANTIGEN (GLUTARALDEHYDE INACTIVATED), BORDETELLA PERTUSSIS FILAMENTOUS HEMAGGLUTININ ANTIGEN (FORMALDEHYDE INACTIVATED), BORDETELLA PERTUSSIS PERTACTIN ANTIGEN, AND BORDETELLA PERTUSSIS FIMBRIAE 2/3 ANTIGEN 0.5 ML: 5; 2; 2.5; 5; 3; 5 INJECTION, SUSPENSION INTRAMUSCULAR at 13:17

## 2021-11-22 RX ADMIN — AMPICILLIN AND SULBACTAM 3 G: 2; 1 INJECTION, POWDER, FOR SOLUTION INTRAVENOUS at 23:26

## 2021-11-22 RX ADMIN — ACETAMINOPHEN 650 MG: 325 TABLET ORAL at 23:25

## 2021-11-22 RX ADMIN — SODIUM CHLORIDE 3 G: 900 INJECTION INTRAVENOUS at 13:20

## 2021-11-22 ASSESSMENT — COGNITIVE AND FUNCTIONAL STATUS - GENERAL
MOBILITY SCORE: 24
SUGGESTED CMS G CODE MODIFIER MOBILITY: CH
DAILY ACTIVITIY SCORE: 24
SUGGESTED CMS G CODE MODIFIER DAILY ACTIVITY: CH

## 2021-11-22 ASSESSMENT — FIBROSIS 4 INDEX
FIB4 SCORE: 0.75
FIB4 SCORE: 0.75

## 2021-11-22 ASSESSMENT — PAIN DESCRIPTION - PAIN TYPE
TYPE: ACUTE PAIN

## 2021-11-22 ASSESSMENT — PATIENT HEALTH QUESTIONNAIRE - PHQ9
2. FEELING DOWN, DEPRESSED, IRRITABLE, OR HOPELESS: NOT AT ALL
SUM OF ALL RESPONSES TO PHQ9 QUESTIONS 1 AND 2: 0
1. LITTLE INTEREST OR PLEASURE IN DOING THINGS: NOT AT ALL

## 2021-11-22 ASSESSMENT — LIFESTYLE VARIABLES
EVER HAD A DRINK FIRST THING IN THE MORNING TO STEADY YOUR NERVES TO GET RID OF A HANGOVER: NO
CONSUMPTION TOTAL: NEGATIVE
ALCOHOL_USE: YES
AVERAGE NUMBER OF DAYS PER WEEK YOU HAVE A DRINK CONTAINING ALCOHOL: 4
TOTAL SCORE: 0
TOTAL SCORE: 0
EVER FELT BAD OR GUILTY ABOUT YOUR DRINKING: NO
TOTAL SCORE: 0
HAVE PEOPLE ANNOYED YOU BY CRITICIZING YOUR DRINKING: NO
HAVE YOU EVER FELT YOU SHOULD CUT DOWN ON YOUR DRINKING: NO
ON A TYPICAL DAY WHEN YOU DRINK ALCOHOL HOW MANY DRINKS DO YOU HAVE: 1
HOW MANY TIMES IN THE PAST YEAR HAVE YOU HAD 5 OR MORE DRINKS IN A DAY: 0

## 2021-11-22 NOTE — ED TRIAGE NOTES
"41 yr old male to triage  Chief Complaint   Patient presents with   • Puncture Wound     Patient report he accidentally stabbed his right hand with a screwdriver while working on a exhaust pipe for a pellet stove.  Right hand swelling with slight redness.       No history of diabetes mellitus.     /93   Pulse 92   Temp 36.9 °C (98.4 °F) (Temporal)   Resp 16   Ht 1.803 m (5' 11\")   Wt 109 kg (239 lb 13.8 oz)   SpO2 95%   BMI 33.45 kg/m²     Has this patient been vaccinated for COVID Yes Moderna   If not, would they like to be vaccinated while in the ER if eligible?  N/A  Would the patient like to speak with the ERP about the possibility of receiving the COVID vaccine today before making a decision? N/A    "

## 2021-11-22 NOTE — ASSESSMENT & PLAN NOTE
8 day old puncture, routine healing. Streaking up the medial aspect of the arm started today  R hand x ray negative    Antibiotics x 5 days, IV Unasyn to start  IV blood cultures collected: NTD  Received Tdap shot

## 2021-11-22 NOTE — H&P
Hospital Medicine History & Physical Note    Date of Service  11/22/2021    Primary Care Physician  Chema Lane P.A.-C.    Consultants  none    Specialist Names: none    Code Status  Full Code    Chief Complaint  Chief Complaint   Patient presents with   • Puncture Wound     Patient report he accidentally stabbed his right hand with a screwdriver while working on a exhaust pipe for a pellet stove.  Right hand swelling with slight redness.         History of Presenting Illness  Brandon Polanco is a 41 y.o. male who presented 11/22/2021 with puncture wound to the right thenar eminence of the right hand.  He was working on his pellet stove and slipped and stabbed himself in the hand.  This was 8 days prior.  He had routine healing of the wound and pain in the hand resolved.  Today he noticed red streaking up his right forearm and presented to Urgent care who sent him to the ED.  He had episode of racing heart rate about 48 hours ago and this has never happened before.  He has been started on Unasyn and blood cultures obtained.  He will be admitted to make sure the infection reverses.     I discussed the plan of care with patient.    Review of Systems  ROS    Past Medical History   has a past medical history of Patient denies medical problems.    Surgical History   has a past surgical history that includes eye surgery and vasectomy.     Family History  family history includes Cancer in his mother; Heart Disease in his maternal grandfather, maternal grandmother, paternal grandfather, and paternal grandmother.   Family history reviewed with patient. There is no family history that is pertinent to the chief complaint.     Social History   reports that he has never smoked. He has never used smokeless tobacco. He reports current alcohol use. He reports that he does not use drugs.    Allergies  No Known Allergies    Medications  Prior to Admission Medications   Prescriptions Last Dose Informant Patient Reported? Taking?    ibuprofen (MOTRIN) 200 MG Tab 11/21/2021 at 1200 Patient Yes Yes   Sig: Take 800 mg by mouth every 6 hours as needed for Mild Pain.      Facility-Administered Medications: None       Physical Exam  Temp:  [36.4 °C (97.6 °F)-36.9 °C (98.4 °F)] 36.9 °C (98.4 °F)  Pulse:  [82-92] 92  Resp:  [16] 16  BP: (138)/(93) 138/93  SpO2:  [95 %-97 %] 95 %  Blood Pressure: 138/93   Temperature: 36.9 °C (98.4 °F)   Pulse: 92   Respiration: 16   Pulse Oximetry: 95 %       Physical Exam    Laboratory:  Recent Labs     11/22/21  1255   WBC 14.9*   RBC 5.81   HEMOGLOBIN 17.3   HEMATOCRIT 50.7   MCV 87.3   MCH 29.8   MCHC 34.1   RDW 37.7   PLATELETCT 194   MPV 12.1     Recent Labs     11/22/21  1255   SODIUM 138   POTASSIUM 4.0   CHLORIDE 101   CO2 24   GLUCOSE 117*   BUN 18   CREATININE 1.17   CALCIUM 9.8     Recent Labs     11/22/21  1255   ALTSGPT 28   ASTSGOT 24   ALKPHOSPHAT 82   TBILIRUBIN 0.6   GLUCOSE 117*         No results for input(s): NTPROBNP in the last 72 hours.      No results for input(s): TROPONINT in the last 72 hours.    Imaging:  DX-HAND 3+ RIGHT   Final Result      No evidence of acute fracture or dislocation.                X-Ray:  I have personally reviewed the images and compared with prior images.    Assessment/Plan:  I anticipate this patient will require at least two midnights for appropriate medical management, necessitating inpatient admission.    * Puncture wound  Assessment & Plan  8 day old puncture, routine healing  Streaking up the medial aspect of the arm started today  Episode of tachycardia 2 days ago but patient denies subjective fever.   Antibiotics x 5 days, IV Unasyn to start  IV blood cultures collected        Leukocytosis  Assessment & Plan  Secondary to subcutaneous infection      Prediabetes- (present on admission)  Assessment & Plan  Slight hyperglycemia  Check A1C      Obesity (BMI 30-39.9)- (present on admission)  Assessment & Plan  Outpatient weight loss counseling          VTE  prophylaxis: SCDs/TEDs

## 2021-11-22 NOTE — PROGRESS NOTES
"Subjective     Brandon Polanco is a 41 y.o. male who presents with Hand Pain ((R) palm swollen, throbbing x last night; a week ago punctured with screwdriver )  No fever chills reported.  Pertinent review of system otherwise negative        HPI    ROS           Objective     Pulse 82   Temp 36.4 °C (97.6 °F) (Temporal)   Resp 16   Ht 1.778 m (5' 10\")   Wt 108 kg (238 lb)   SpO2 97%   BMI 34.15 kg/m²      Physical Exam  Constitutional:       General: He is not in acute distress.     Appearance: He is not ill-appearing, toxic-appearing or diaphoretic.   Cardiovascular:      Rate and Rhythm: Normal rate.   Pulmonary:      Effort: Pulmonary effort is normal. No respiratory distress.      Breath sounds: No stridor.   Musculoskeletal:      Comments: Puncture wound noted on the right hand between the fourth and third finger with large amount of swelling and redness which is extends to the back of the hand as well.   Skin:     Coloration: Skin is not jaundiced or pale.   Neurological:      Mental Status: He is alert and oriented to person, place, and time.   Psychiatric:         Behavior: Behavior normal.             Assessment & Plan       ASSESSMENT:PLAN:  1. Puncture wound of right hand with infection, initial encounter    Most likely an abscess buildup  Need further evaluation in the emergency department setting.  Recommend to go to nearest emergency department for further evaluation.        "

## 2021-11-22 NOTE — ED NOTES
Med rec updated and complete  Allergies reviewed  Pt reports no prescription medications or vitamins   Pt reports no antibiotics in the last 30 days.    No current facility-administered medications on file prior to encounter.     Current Outpatient Medications on File Prior to Encounter   Medication Sig Dispense Refill   • ibuprofen (MOTRIN) 200 MG Tab Take 800 mg by mouth every 6 hours as needed for Mild Pain.

## 2021-11-22 NOTE — ED PROVIDER NOTES
"ED Provider Note    CHIEF COMPLAINT  Chief Complaint   Patient presents with   • Puncture Wound     Patient report he accidentally stabbed his right hand with a screwdriver while working on a exhaust pipe for a pellet stove.  Right hand swelling with slight redness.         HPI  Brandon Brendan Polanco is a 41 y.o. male here for evaluation of right hand pain.  The pt accidentally 'stabbed' himself with a screwdriver, when he was working on a stove. Has states this was a week ago Sunday, and did not have any other issues.  He subsequently developed some pain around the site, with some lymphangitis over the last day.  He has no fever/no chills. No vomiting. No cp, no sob.      ROS;  Please see HPI  O/W negative     PAST MEDICAL HISTORY   has a past medical history of Patient denies medical problems.    SOCIAL HISTORY  Social History     Tobacco Use   • Smoking status: Never Smoker   • Smokeless tobacco: Never Used   Vaping Use   • Vaping Use: Never used   Substance and Sexual Activity   • Alcohol use: Yes     Alcohol/week: 0.0 oz     Comment: 1 glass whiskey with dinner 6 d/wk   • Drug use: No   • Sexual activity: Yes     Partners: Female     Birth control/protection: Surgical     Comment: , 2 daughters, [employer]       SURGICAL HISTORY   has a past surgical history that includes eye surgery and vasectomy.    CURRENT MEDICATIONS  Home Medications     Reviewed by Gabriella Frausto R.N. (Registered Nurse) on 11/22/21 at 1147  Med List Status: Complete   Medication Last Dose Status        Patient Derrell Taking any Medications                       ALLERGIES  No Known Allergies    REVIEW OF SYSTEMS  See HPI for further details. Review of systems as above, otherwise all other systems are negative.     PHYSICAL EXAM  VITAL SIGNS: /93   Pulse 92   Temp 36.9 °C (98.4 °F) (Temporal)   Resp 16   Ht 1.803 m (5' 11\")   Wt 109 kg (239 lb 13.8 oz)   SpO2 95%   BMI 33.45 kg/m²     Constitutional: Well developed, well nourished. " No acute distress.  HEENT: Normocephalic, atraumatic. MMM  Neck: Supple, Full range of motion   Chest/Pulmonary:  No respiratory distress.  Equal expansion   Musculoskeletal: No deformity, no edema, neurovascular intact. Right hand;  Palmar aspect with puncture wound, with lymphangitis extending proximally on the volar aspect of the right forearm.   Neuro: Clear speech, appropriate, cooperative, cranial nerves II-XII grossly intact.  Psych: Normal mood and affect      PROCEDURES     MEDICAL RECORD  I have reviewed patient's medical record and pertinent results are listed.    COURSE & MEDICAL DECISION MAKING  I have reviewed any medical record information, laboratory studies and radiographic results as noted above.    Results for orders placed or performed during the hospital encounter of 11/22/21   CBC WITH DIFFERENTIAL   Result Value Ref Range    WBC 14.9 (H) 4.8 - 10.8 K/uL    RBC 5.81 4.70 - 6.10 M/uL    Hemoglobin 17.3 14.0 - 18.0 g/dL    Hematocrit 50.7 42.0 - 52.0 %    MCV 87.3 81.4 - 97.8 fL    MCH 29.8 27.0 - 33.0 pg    MCHC 34.1 33.7 - 35.3 g/dL    RDW 37.7 35.9 - 50.0 fL    Platelet Count 194 164 - 446 K/uL    MPV 12.1 9.0 - 12.9 fL    Neutrophils-Polys 75.40 (H) 44.00 - 72.00 %    Lymphocytes 13.60 (L) 22.00 - 41.00 %    Monocytes 9.10 0.00 - 13.40 %    Eosinophils 0.80 0.00 - 6.90 %    Basophils 0.50 0.00 - 1.80 %    Immature Granulocytes 0.60 0.00 - 0.90 %    Nucleated RBC 0.00 /100 WBC    Neutrophils (Absolute) 11.22 (H) 1.82 - 7.42 K/uL    Lymphs (Absolute) 2.03 1.00 - 4.80 K/uL    Monos (Absolute) 1.36 (H) 0.00 - 0.85 K/uL    Eos (Absolute) 0.12 0.00 - 0.51 K/uL    Baso (Absolute) 0.07 0.00 - 0.12 K/uL    Immature Granulocytes (abs) 0.09 0.00 - 0.11 K/uL    NRBC (Absolute) 0.00 K/uL   COMP METABOLIC PANEL   Result Value Ref Range    Sodium 138 135 - 145 mmol/L    Potassium 4.0 3.6 - 5.5 mmol/L    Chloride 101 96 - 112 mmol/L    Co2 24 20 - 33 mmol/L    Anion Gap 13.0 7.0 - 16.0    Glucose 117 (H) 65  - 99 mg/dL    Bun 18 8 - 22 mg/dL    Creatinine 1.17 0.50 - 1.40 mg/dL    Calcium 9.8 8.4 - 10.2 mg/dL    AST(SGOT) 24 12 - 45 U/L    ALT(SGPT) 28 2 - 50 U/L    Alkaline Phosphatase 82 30 - 99 U/L    Total Bilirubin 0.6 0.1 - 1.5 mg/dL    Albumin 4.8 3.2 - 4.9 g/dL    Total Protein 7.2 6.0 - 8.2 g/dL    Globulin 2.4 1.9 - 3.5 g/dL    A-G Ratio 2.0 g/dL   ESTIMATED GFR   Result Value Ref Range    GFR If African American >60 >60 mL/min/1.73 m 2    GFR If Non African American >60 >60 mL/min/1.73 m 2     DX-HAND 3+ RIGHT   Final Result      No evidence of acute fracture or dislocation.                  FINAL IMPRESSION  1. Puncture wound     2. Cellulitis, unspecified cellulitis site             Electronically signed by: Gume Ramos D.O., 11/22/2021 12:47 PM

## 2021-11-23 LAB
ANION GAP SERPL CALC-SCNC: 13 MMOL/L (ref 7–16)
BUN SERPL-MCNC: 17 MG/DL (ref 8–22)
CALCIUM SERPL-MCNC: 9.2 MG/DL (ref 8.4–10.2)
CHLORIDE SERPL-SCNC: 103 MMOL/L (ref 96–112)
CO2 SERPL-SCNC: 24 MMOL/L (ref 20–33)
CREAT SERPL-MCNC: 1.26 MG/DL (ref 0.5–1.4)
ERYTHROCYTE [DISTWIDTH] IN BLOOD BY AUTOMATED COUNT: 39.4 FL (ref 35.9–50)
GLUCOSE SERPL-MCNC: 112 MG/DL (ref 65–99)
HCT VFR BLD AUTO: 47 % (ref 42–52)
HGB BLD-MCNC: 15.8 G/DL (ref 14–18)
MCH RBC QN AUTO: 29.9 PG (ref 27–33)
MCHC RBC AUTO-ENTMCNC: 33.6 G/DL (ref 33.7–35.3)
MCV RBC AUTO: 88.8 FL (ref 81.4–97.8)
PLATELET # BLD AUTO: 162 K/UL (ref 164–446)
PMV BLD AUTO: 12.9 FL (ref 9–12.9)
POTASSIUM SERPL-SCNC: 4.4 MMOL/L (ref 3.6–5.5)
RBC # BLD AUTO: 5.29 M/UL (ref 4.7–6.1)
SODIUM SERPL-SCNC: 140 MMOL/L (ref 135–145)
WBC # BLD AUTO: 11.5 K/UL (ref 4.8–10.8)

## 2021-11-23 PROCEDURE — 36415 COLL VENOUS BLD VENIPUNCTURE: CPT

## 2021-11-23 PROCEDURE — 700111 HCHG RX REV CODE 636 W/ 250 OVERRIDE (IP): Performed by: INTERNAL MEDICINE

## 2021-11-23 PROCEDURE — 80048 BASIC METABOLIC PNL TOTAL CA: CPT

## 2021-11-23 PROCEDURE — 99233 SBSQ HOSP IP/OBS HIGH 50: CPT | Performed by: STUDENT IN AN ORGANIZED HEALTH CARE EDUCATION/TRAINING PROGRAM

## 2021-11-23 PROCEDURE — 700102 HCHG RX REV CODE 250 W/ 637 OVERRIDE(OP): Performed by: INTERNAL MEDICINE

## 2021-11-23 PROCEDURE — A9270 NON-COVERED ITEM OR SERVICE: HCPCS | Performed by: INTERNAL MEDICINE

## 2021-11-23 PROCEDURE — 700105 HCHG RX REV CODE 258: Performed by: INTERNAL MEDICINE

## 2021-11-23 PROCEDURE — 85027 COMPLETE CBC AUTOMATED: CPT

## 2021-11-23 PROCEDURE — 770006 HCHG ROOM/CARE - MED/SURG/GYN SEMI*

## 2021-11-23 PROCEDURE — 94760 N-INVAS EAR/PLS OXIMETRY 1: CPT

## 2021-11-23 RX ADMIN — OXYCODONE 2.5 MG: 5 TABLET ORAL at 09:54

## 2021-11-23 RX ADMIN — AMPICILLIN AND SULBACTAM 3 G: 2; 1 INJECTION, POWDER, FOR SOLUTION INTRAVENOUS at 18:17

## 2021-11-23 RX ADMIN — AMPICILLIN AND SULBACTAM 3 G: 2; 1 INJECTION, POWDER, FOR SOLUTION INTRAVENOUS at 23:59

## 2021-11-23 RX ADMIN — AMPICILLIN AND SULBACTAM 3 G: 2; 1 INJECTION, POWDER, FOR SOLUTION INTRAVENOUS at 05:36

## 2021-11-23 RX ADMIN — ACETAMINOPHEN 650 MG: 325 TABLET ORAL at 13:50

## 2021-11-23 RX ADMIN — ACETAMINOPHEN 650 MG: 325 TABLET ORAL at 22:23

## 2021-11-23 RX ADMIN — AMPICILLIN AND SULBACTAM 3 G: 2; 1 INJECTION, POWDER, FOR SOLUTION INTRAVENOUS at 12:07

## 2021-11-23 RX ADMIN — ACETAMINOPHEN 650 MG: 325 TABLET ORAL at 07:44

## 2021-11-23 RX ADMIN — SENNOSIDES AND DOCUSATE SODIUM 2 TABLET: 50; 8.6 TABLET ORAL at 18:17

## 2021-11-23 ASSESSMENT — PAIN DESCRIPTION - PAIN TYPE
TYPE: ACUTE PAIN

## 2021-11-23 NOTE — DISCHARGE PLANNING
Anticipated Discharge Disposition: Home    Action: pt pending medical clearance, pt on room air, 6 clicks are 24, non case management needs identified at this time.    Barriers to Discharge: medical clearance, blood cultures pending    Plan: f/u with medical team and pt to discuss dc needs and barriers.

## 2021-11-23 NOTE — CARE PLAN
The patient is Stable - Low risk of patient condition declining or worsening    Shift Goals  Clinical Goals: Pain control and ABX   Patient Goals: Swelling reduction     Progress made toward(s) clinical / shift goals:  POC discussed. Pt is able to find adequate pain control with tylenol, elevation and ice.     Patient is not progressing towards the following goals: n/a      Problem: Pain - Standard  Goal: Alleviation of pain or a reduction in pain to the patient’s comfort goal  Outcome: Progressing     Problem: Knowledge Deficit - Standard  Goal: Patient and family/care givers will demonstrate understanding of plan of care, disease process/condition, diagnostic tests and medications  Outcome: Progressing

## 2021-11-23 NOTE — PROGRESS NOTES
COVID 19 surge in effect     1900: Received report from Day shift RN. Pt is laying in bed with wife at bedside, A+OX4 , showing no signs of distress. Pt c/o 3/10 pain and denies the need for pain medication. Ice packs given. POC discussed.  VSS. Pt c/o numbness in his 4th and 5th finger in his right hand. Call light and belongings at bedside and within reach. All questions answered at this time. Rounding in place.

## 2021-11-23 NOTE — PROGRESS NOTES
[facility] Medicine Daily Progress Note    Date of Service  11/23/2021    Chief Complaint  Brandon Polanco is a 41 y.o. male admitted 11/22/2021 with R hand puncture wound    Hospital Course  41 y.o. male who presented 11/22/2021 with puncture wound to the right thenar eminence of the right hand.  He was working on his pellet stove and slipped and stabbed himself in the hand.  This was 8 days prior.  He had routine healing of the wound and pain in the hand resolved. He noticed red streaking up his right forearm and presented to Urgent care who sent him to the ED.  Received Tdap shot.   R hand x ray No evidence of acute fracture or dislocation.   On IV unasyn  Blood culture negative    Interval Problem Update  Noted R hand swelling, erythema improving. Received Tdap vaccine.   On IV unasyn. Wbc improving, but still high.   Will continue iv unasyn    I have personally seen and examined the patient at bedside. I discussed the plan of care with patient, family, bedside RN, charge RN and .    Consultants/Specialty  none    Code Status  Full Code    Disposition  Patient is not medically cleared.   Anticipate discharge to to home with close outpatient follow-up.  I have placed the appropriate orders for post-discharge needs.    Review of Systems  Review of Systems   Musculoskeletal: Positive for joint pain (R hand ).        R hand swelling   All other systems reviewed and are negative.       Physical Exam  Temp:  [36.4 °C (97.6 °F)-36.9 °C (98.4 °F)] 36.4 °C (97.6 °F)  Pulse:  [64-92] 81  Resp:  [16-17] 17  BP: (109-138)/(63-95) 109/63  SpO2:  [94 %-97 %] 94 %    Physical Exam  Vitals and nursing note reviewed.   Constitutional:       General: He is not in acute distress.     Appearance: Normal appearance. He is obese. He is not ill-appearing.   HENT:      Head: Normocephalic and atraumatic.      Mouth/Throat:      Mouth: Mucous membranes are dry.      Pharynx: Oropharynx is clear.   Eyes:      Pupils: Pupils  are equal, round, and reactive to light.   Neck:      Vascular: No carotid bruit.   Cardiovascular:      Rate and Rhythm: Normal rate and regular rhythm.   Pulmonary:      Effort: Pulmonary effort is normal.      Breath sounds: Normal breath sounds.   Abdominal:      General: Abdomen is flat. Bowel sounds are normal.      Palpations: Abdomen is soft. There is no mass.   Musculoskeletal:         General: Swelling (R hand) present. Normal range of motion.      Cervical back: Neck supple.      Comments: Small round puncture wound noted on R hand thenar eminence   Skin:     General: Skin is warm and dry.   Neurological:      General: No focal deficit present.      Mental Status: He is alert and oriented to person, place, and time.      Sensory: No sensory deficit.   Psychiatric:         Mood and Affect: Mood normal.         Behavior: Behavior normal.         Fluids    Intake/Output Summary (Last 24 hours) at 11/23/2021 1049  Last data filed at 11/23/2021 0800  Gross per 24 hour   Intake 670 ml   Output --   Net 670 ml       Laboratory  Recent Labs     11/22/21  1255 11/23/21  0309   WBC 14.9* 11.5*   RBC 5.81 5.29   HEMOGLOBIN 17.3 15.8   HEMATOCRIT 50.7 47.0   MCV 87.3 88.8   MCH 29.8 29.9   MCHC 34.1 33.6*   RDW 37.7 39.4   PLATELETCT 194 162*   MPV 12.1 12.9     Recent Labs     11/22/21  1255 11/23/21  0309   SODIUM 138 140   POTASSIUM 4.0 4.4   CHLORIDE 101 103   CO2 24 24   GLUCOSE 117* 112*   BUN 18 17   CREATININE 1.17 1.26   CALCIUM 9.8 9.2                   Imaging  DX-HAND 3+ RIGHT   Final Result      No evidence of acute fracture or dislocation.                 Assessment/Plan  * Puncture wound  Assessment & Plan  8 day old puncture, routine healing. Streaking up the medial aspect of the arm started today  R hand x ray negative    Antibiotics x 5 days, IV Unasyn to start  IV blood cultures collected: NTD  Received Tdap shot        Leukocytosis  Assessment & Plan  Secondary to subcutaneous  infection      Prediabetes- (present on admission)  Assessment & Plan  Slight hyperglycemia  Check A1C 5.8      Obesity (BMI 30-39.9)- (present on admission)  Assessment & Plan  Outpatient weight loss counseling         VTE prophylaxis: SCDs/TEDs    I have performed a physical exam and reviewed and updated ROS and Plan today (11/23/2021). In review of yesterday's note (11/22/2021), there are no changes except as documented above.

## 2021-11-23 NOTE — CARE PLAN
The patient is Stable - Low risk of patient condition declining or worsening    Shift Goals  Clinical Goals: Pain control  Patient Goals: Reduce swelling in hand    Progress made toward(s) clinical / shift goals:  Pain and swelling controlled with mild pain medication and ice pack    Problem: Pain - Standard  Goal: Alleviation of pain or a reduction in pain to the patient’s comfort goal  Outcome: Progressing     Problem: Knowledge Deficit - Standard  Goal: Patient and family/care givers will demonstrate understanding of plan of care, disease process/condition, diagnostic tests and medications  Outcome: Progressing       Patient is not progressing towards the following goals:

## 2021-11-23 NOTE — CARE PLAN
Problem: Pain - Standard  Goal: Alleviation of pain or a reduction in pain to the patient’s comfort goal  Outcome: Progressing   The patient is Stable - Low risk of patient condition declining or worsening    Shift Goals  Clinical Goals: Pain control and ABX   Patient Goals: Swelling reduction     Progress made toward(s) clinical / shift goals:  Pain meds given per MAR and ice given.    Patient is not progressing towards the following goals: N/A

## 2021-11-23 NOTE — DISCHARGE PLANNING
Anticipated Discharge Disposition: Home    Action: LSW completed chart review. Discussed pt in rounds, per MD pt is 10X10 tomorrow. No d/c needs reported or identified at this time.    Barriers to Discharge: None    Plan: LSW to follow and assist as needed.    Care Transition Team Assessment    Information Source  Orientation Level: Oriented X4  Information Given By:  (chart review)  Who is responsible for making decisions for patient? : Patient    Readmission Evaluation  Is this a readmission?: No    Elopement Risk  Legal Hold: No  Ambulatory or Self Mobile in Wheelchair: Yes  Disoriented: No  Psychiatric Symptoms: None  History of Wandering: No  Elopement this Admit: No  Vocalizing Wanting to Leave: No  Displays Behaviors, Body Language Wanting to Leave: No-Not at Risk for Elopement  Elopement Risk: Not at Risk for Elopement    Interdisciplinary Discharge Planning  Primary Care Physician: Chema Lane  Patient or legal guardian wants to designate a caregiver: No  Support Systems: Spouse / Significant Other    Discharge Preparedness  What is your plan after discharge?: Home with help  What are your discharge supports?: Spouse  Prior Functional Level: Ambulatory  Difficulity with ADLs: None  Difficulity with IADLs: None    Functional Assesment  Prior Functional Level: Ambulatory    Finances  Financial Barriers to Discharge: No    Vision / Hearing Impairment  Vision Impairment : No  Hearing Impairment : No         Advance Directive  Advance Directive?: None    Domestic Abuse  Have you ever been the victim of abuse or violence?: No  Physical Abuse or Sexual Abuse: No  Verbal Abuse or Emotional Abuse: No  Possible Abuse/Neglect Reported to:: Not Applicable    Psychological Assessment  History of Substance Abuse: None    Discharge Risks or Barriers  Discharge risks or barriers?: No    Anticipated Discharge Information  Discharge Disposition: D/T to home/self-care w/planned acute care hosp IP readmit (93)

## 2021-11-24 ENCOUNTER — APPOINTMENT (OUTPATIENT)
Dept: MEDICAL GROUP | Facility: MEDICAL CENTER | Age: 41
End: 2021-11-24
Payer: OTHER GOVERNMENT

## 2021-11-24 VITALS
OXYGEN SATURATION: 94 % | HEART RATE: 83 BPM | TEMPERATURE: 98.6 F | SYSTOLIC BLOOD PRESSURE: 118 MMHG | WEIGHT: 239.86 LBS | BODY MASS INDEX: 33.58 KG/M2 | DIASTOLIC BLOOD PRESSURE: 73 MMHG | HEIGHT: 71 IN | RESPIRATION RATE: 18 BRPM

## 2021-11-24 LAB
ANION GAP SERPL CALC-SCNC: 11 MMOL/L (ref 7–16)
BASOPHILS # BLD AUTO: 0.5 % (ref 0–1.8)
BASOPHILS # BLD: 0.05 K/UL (ref 0–0.12)
BUN SERPL-MCNC: 13 MG/DL (ref 8–22)
CALCIUM SERPL-MCNC: 8.8 MG/DL (ref 8.4–10.2)
CHLORIDE SERPL-SCNC: 101 MMOL/L (ref 96–112)
CO2 SERPL-SCNC: 25 MMOL/L (ref 20–33)
CREAT SERPL-MCNC: 1.36 MG/DL (ref 0.5–1.4)
EOSINOPHIL # BLD AUTO: 0.18 K/UL (ref 0–0.51)
EOSINOPHIL NFR BLD: 1.7 % (ref 0–6.9)
ERYTHROCYTE [DISTWIDTH] IN BLOOD BY AUTOMATED COUNT: 38.8 FL (ref 35.9–50)
GLUCOSE SERPL-MCNC: 110 MG/DL (ref 65–99)
HCT VFR BLD AUTO: 47.9 % (ref 42–52)
HGB BLD-MCNC: 15.8 G/DL (ref 14–18)
IMM GRANULOCYTES # BLD AUTO: 0.05 K/UL (ref 0–0.11)
IMM GRANULOCYTES NFR BLD AUTO: 0.5 % (ref 0–0.9)
LYMPHOCYTES # BLD AUTO: 1.95 K/UL (ref 1–4.8)
LYMPHOCYTES NFR BLD: 18.5 % (ref 22–41)
MCH RBC QN AUTO: 29.6 PG (ref 27–33)
MCHC RBC AUTO-ENTMCNC: 33 G/DL (ref 33.7–35.3)
MCV RBC AUTO: 89.7 FL (ref 81.4–97.8)
MONOCYTES # BLD AUTO: 1.65 K/UL (ref 0–0.85)
MONOCYTES NFR BLD AUTO: 15.7 % (ref 0–13.4)
NEUTROPHILS # BLD AUTO: 6.64 K/UL (ref 1.82–7.42)
NEUTROPHILS NFR BLD: 63.1 % (ref 44–72)
NRBC # BLD AUTO: 0 K/UL
NRBC BLD-RTO: 0 /100 WBC
PLATELET # BLD AUTO: 153 K/UL (ref 164–446)
PMV BLD AUTO: 12.6 FL (ref 9–12.9)
POTASSIUM SERPL-SCNC: 4.4 MMOL/L (ref 3.6–5.5)
RBC # BLD AUTO: 5.34 M/UL (ref 4.7–6.1)
SODIUM SERPL-SCNC: 137 MMOL/L (ref 135–145)
WBC # BLD AUTO: 10.5 K/UL (ref 4.8–10.8)

## 2021-11-24 PROCEDURE — 700102 HCHG RX REV CODE 250 W/ 637 OVERRIDE(OP): Performed by: INTERNAL MEDICINE

## 2021-11-24 PROCEDURE — 85025 COMPLETE CBC W/AUTO DIFF WBC: CPT

## 2021-11-24 PROCEDURE — 700111 HCHG RX REV CODE 636 W/ 250 OVERRIDE (IP): Performed by: INTERNAL MEDICINE

## 2021-11-24 PROCEDURE — 80048 BASIC METABOLIC PNL TOTAL CA: CPT

## 2021-11-24 PROCEDURE — 700105 HCHG RX REV CODE 258: Performed by: INTERNAL MEDICINE

## 2021-11-24 PROCEDURE — A9270 NON-COVERED ITEM OR SERVICE: HCPCS | Performed by: INTERNAL MEDICINE

## 2021-11-24 PROCEDURE — 99239 HOSP IP/OBS DSCHRG MGMT >30: CPT | Performed by: STUDENT IN AN ORGANIZED HEALTH CARE EDUCATION/TRAINING PROGRAM

## 2021-11-24 PROCEDURE — 36415 COLL VENOUS BLD VENIPUNCTURE: CPT

## 2021-11-24 RX ORDER — AMOXICILLIN AND CLAVULANATE POTASSIUM 875; 125 MG/1; MG/1
1 TABLET, FILM COATED ORAL 2 TIMES DAILY
Qty: 10 TABLET | Refills: 0 | Status: SHIPPED | OUTPATIENT
Start: 2021-11-24 | End: 2021-11-29

## 2021-11-24 RX ADMIN — AMPICILLIN AND SULBACTAM 3 G: 2; 1 INJECTION, POWDER, FOR SOLUTION INTRAVENOUS at 05:13

## 2021-11-24 RX ADMIN — ACETAMINOPHEN 650 MG: 325 TABLET ORAL at 06:49

## 2021-11-24 ASSESSMENT — PAIN DESCRIPTION - PAIN TYPE: TYPE: ACUTE PAIN

## 2021-11-24 ASSESSMENT — PATIENT HEALTH QUESTIONNAIRE - PHQ9
1. LITTLE INTEREST OR PLEASURE IN DOING THINGS: NOT AT ALL
SUM OF ALL RESPONSES TO PHQ9 QUESTIONS 1 AND 2: 0
2. FEELING DOWN, DEPRESSED, IRRITABLE, OR HOPELESS: NOT AT ALL

## 2021-11-24 NOTE — DISCHARGE PLANNING
ANTICIPATED DISCHARGE DISPOSITION: Home with help    ACTION: Patient discussed in Discharge rounds and has been medically cleared by MD. 6-click score 24 and patient 94-98% RA. No discharge needs identified at this time.     BARRIERS TO DISCHARGE: None    PLAN: CM will continue to monitor for additional discharge needs.

## 2021-11-24 NOTE — CARE PLAN
The patient is Stable - Low risk of patient condition declining or worsening    Shift Goals  Clinical Goals: patient will state understanding of discharge instructions     Progress made toward(s) clinical / shift goals:  patient states understanding

## 2021-11-24 NOTE — DISCHARGE INSTRUCTIONS
Discharge Instructions    Discharged to home by car with relative. Discharged via walking, hospital escort: Yes.  Special equipment needed: Not Applicable    Be sure to schedule a follow-up appointment with your primary care doctor or any specialists as instructed.     Discharge Plan:   Diet Plan: Discussed  Activity Level: Discussed  Confirmed Follow up Appointment: Appointment Scheduled  Confirmed Symptoms Management: Discussed  Medication Reconciliation Updated: Yes  Influenza Vaccine Indication: Not indicated: Previously immunized this influenza season and > 8 years of age    I understand that a diet low in cholesterol, fat, and sodium is recommended for good health. Unless I have been given specific instructions below for another diet, I accept this instruction as my diet prescription.   Other diet: Home Diet    Special Instructions: None    · Is patient discharged on Warfarin / Coumadin?   No     Depression / Suicide Risk    As you are discharged from this [facility] facility, it is important to learn how to keep safe from harming yourself.    Recognize the warning signs:  · Abrupt changes in personality, positive or negative- including increase in energy   · Giving away possessions  · Change in eating patterns- significant weight changes-  positive or negative  · Change in sleeping patterns- unable to sleep or sleeping all the time   · Unwillingness or inability to communicate  · Depression  · Unusual sadness, discouragement and loneliness  · Talk of wanting to die  · Neglect of personal appearance   · Rebelliousness- reckless behavior  · Withdrawal from people/activities they love  · Confusion- inability to concentrate     If you or a loved one observes any of these behaviors or has concerns about self-harm, here's what you can do:  · Talk about it- your feelings and reasons for harming yourself  · Remove any means that you might use to hurt yourself (examples: pills, rope, extension cords,  firearm)  · Get professional help from the community (Mental Health, Substance Abuse, psychological counseling)  · Do not be alone:Call your Safe Contact- someone whom you trust who will be there for you.  · Call your local CRISIS HOTLINE 103-0257 or 395-909-3342  · Call your local Children's Mobile Crisis Response Team [place] (761) 356-7570 or www.Adapt  · Call the toll free National Suicide Prevention Hotlines   · National Suicide Prevention Lifeline 239-797-PYBE (2379)  · [employer] SOCIAL Line Network 800-SUICIDE (727-8173)    Discharge Instructions per Garrett Viera M.D.    Please follow-up with PCP as outpatient.  Take antibiotics as directed    DIET: regular diet    ACTIVITY: As tolerated    DIAGNOSIS: puncture wound, cellulitis    Return to ER in the event of new or worsening symptoms. Please note importance of compliance and the patient has agreed to proceed with all medical recommendations and follow up plan indicated above. All medications come with benefits and risks. Risks may include permanent injury or death and these risks can be minimized with close reassessment and monitoring. Please make it to your scheduled follow ups with **, M.D., and/or specialists **

## 2021-11-24 NOTE — CARE PLAN
Problem: Pain - Standard  Goal: Alleviation of pain or a reduction in pain to the patient’s comfort goal  Outcome: Progressing     Problem: Knowledge Deficit - Standard  Goal: Patient and family/care givers will demonstrate understanding of plan of care, disease process/condition, diagnostic tests and medications  Outcome: Progressing   The patient is Stable - Low risk of patient condition declining or worsening    Shift Goals  Clinical Goals: pain control   Patient Goals: swelling reduction     Progress made toward(s) clinical / shift goals:  all goals     Patient is not progressing towards the following goals:

## 2021-11-24 NOTE — DISCHARGE SUMMARY
Discharge Summary    CHIEF COMPLAINT ON ADMISSION  Chief Complaint   Patient presents with   • Puncture Wound     Patient report he accidentally stabbed his right hand with a screwdriver while working on a exhaust pipe for a pellet stove.  Right hand swelling with slight redness.         Reason for Admission  Hand Laceration     Admission Date  11/22/2021    CODE STATUS  Full Code    HPI & HOSPITAL COURSE  41 y.o. male who presented 11/22/2021 with puncture wound to the right thenar eminence of the right hand.  He was working on his pellet stove and slipped and stabbed himself in the hand.  This was 8 days prior.  He had routine healing of the wound and pain in the hand resolved. He noticed red streaking up his right forearm and presented to Urgent care who sent him to the ED.    R hand x ray No evidence of acute fracture or dislocation.   He was started on IV unasyn and received Tdap shot.   Blood culture negative.   On the day of discharge, leukocytosis resolved. R hand swelling and erythema improved significantly. He will be discharged with Augmentin for complete 7 days course.     Therefore, he is discharged in good and stable condition to home with close outpatient follow-up.    The patient met 2-midnight criteria for an inpatient stay at the time of discharge. He is advised to follow up with PCP in one week.     Discharge Date  11/24/2021    FOLLOW UP ITEMS POST DISCHARGE  PCP    DISCHARGE DIAGNOSES  Principal Problem:    Puncture wound POA: Unknown  Active Problems:    Obesity (BMI 30-39.9) POA: Yes    Prediabetes POA: Yes    Leukocytosis POA: Unknown  Resolved Problems:    * No resolved hospital problems. *      FOLLOW UP  No future appointments.  Chema Lane, P.A.-C.  [address]  [address]  [facility] [id number] [phone]    In 1 week        MEDICATIONS ON DISCHARGE     Medication List      START taking these medications      Instructions   amoxicillin-clavulanate 875-125 MG Tabs  Commonly known  as: AUGMENTIN   Take 1 Tablet by mouth 2 times a day for 5 days.  Dose: 1 Tablet        CONTINUE taking these medications      Instructions   ibuprofen 200 MG Tabs  Commonly known as: MOTRIN   Take 800 mg by mouth every 6 hours as needed for Mild Pain.  Dose: 800 mg            Allergies  No Known Allergies    DIET  Orders Placed This Encounter   Procedures   • Diet Order Diet: Regular     Standing Status:   Standing     Number of Occurrences:   1     Order Specific Question:   Diet:     Answer:   Regular [1]       ACTIVITY  As tolerated.  Weight bearing as tolerated    CONSULTATIONS  none    PROCEDURES  none    LABORATORY  Lab Results   Component Value Date    SODIUM 137 11/24/2021    POTASSIUM 4.4 11/24/2021    CHLORIDE 101 11/24/2021    CO2 25 11/24/2021    GLUCOSE 110 (H) 11/24/2021    BUN 13 11/24/2021    CREATININE 1.36 11/24/2021        Lab Results   Component Value Date    WBC 10.5 11/24/2021    HEMOGLOBIN 15.8 11/24/2021    HEMATOCRIT 47.9 11/24/2021    PLATELETCT 153 (L) 11/24/2021        Total time of the discharge process exceeds 32 minutes.

## 2021-11-27 LAB
BACTERIA BLD CULT: NORMAL
BACTERIA BLD CULT: NORMAL
SIGNIFICANT IND 70042: NORMAL
SIGNIFICANT IND 70042: NORMAL
SITE SITE: NORMAL
SITE SITE: NORMAL
SOURCE SOURCE: NORMAL
SOURCE SOURCE: NORMAL

## 2021-12-01 ENCOUNTER — OFFICE VISIT (OUTPATIENT)
Dept: MEDICAL GROUP | Facility: MEDICAL CENTER | Age: 41
End: 2021-12-01
Payer: OTHER GOVERNMENT

## 2021-12-01 VITALS
TEMPERATURE: 97.2 F | BODY MASS INDEX: 34.13 KG/M2 | SYSTOLIC BLOOD PRESSURE: 122 MMHG | HEART RATE: 75 BPM | OXYGEN SATURATION: 96 % | DIASTOLIC BLOOD PRESSURE: 88 MMHG | HEIGHT: 70 IN | WEIGHT: 238.4 LBS

## 2021-12-01 DIAGNOSIS — R94.4 DECREASED GFR: ICD-10-CM

## 2021-12-01 DIAGNOSIS — D72.829 LEUKOCYTOSIS, UNSPECIFIED TYPE: ICD-10-CM

## 2021-12-01 DIAGNOSIS — T14.8XXA PUNCTURE WOUND: ICD-10-CM

## 2021-12-01 DIAGNOSIS — R73.03 PREDIABETES: ICD-10-CM

## 2021-12-01 PROCEDURE — 99214 OFFICE O/P EST MOD 30 MIN: CPT | Performed by: PHYSICIAN ASSISTANT

## 2021-12-01 ASSESSMENT — FIBROSIS 4 INDEX: FIB4 SCORE: 1.19

## 2021-12-01 NOTE — ASSESSMENT & PLAN NOTE
This is a pleasant 41-year-old male here today following up from a hospitalization secondary to cellulitis of his right hand.  He stabbed himself with a screwdriver at home.  Was working on his pellet stove.  Was doing well until woke up in the middle the night with his hand swollen and a red streak going up the hand.  Was provided IV antibiotics and discharged with amoxicillin.  Overall is doing well.  No hand pain.  Does have some fatigue.  Initially by Thompson count greater than 14.  Upon discharge his GFR was low.  States he had problems with his GFR many years ago because of creatine use.  Stop the supplement and his kidney function normalized.

## 2021-12-01 NOTE — PROGRESS NOTES
"Subjective:   Brandon Polanco is a 41 y.o. male here today for hospitalization secondary to cellulitis of the right hand and prediabetes.    Puncture wound  This is a pleasant 41-year-old male here today following up from a hospitalization secondary to cellulitis of his right hand.  He stabbed himself with a screwdriver at home.  Was working on his pellet stove.  Was doing well until woke up in the middle the night with his hand swollen and a red streak going up the hand.  Was provided IV antibiotics and discharged with amoxicillin.  Overall is doing well.  No hand pain.  Does have some fatigue.  Initially by Thompson count greater than 14.  Upon discharge his GFR was low.  States he had problems with his GFR many years ago because of creatine use.  Stop the supplement and his kidney function normalized.    Prediabetes  A1c recently at 5.7.  4 days later in the hospital it was rechecked for some reason and was 5.8.  He is aware of this condition.  Plans to make some lifestyle modifications.       Current medicines (including changes today)  No current outpatient medications on file.     No current facility-administered medications for this visit.     He  has a past medical history of Patient denies medical problems. He also has no past medical history of Allergy or ASTHMA.    Social History and Family History were reviewed and updated.    ROS   No chest pain, no shortness of breath, no abdominal pain and all other systems were reviewed and are negative.       Objective:     /88 (BP Location: Right arm, Patient Position: Sitting, BP Cuff Size: Adult)   Pulse 75   Temp 36.2 °C (97.2 °F) (Temporal)   Ht 1.778 m (5' 10\")   Wt 108 kg (238 lb 6.4 oz)   SpO2 96%  Body mass index is 34.21 kg/m².   Physical Exam:  Constitutional: Alert, no distress.  Skin: Warm, dry, good turgor, no rashes in visible areas.  Eye: Equal, round and reactive, conjunctiva clear, lids normal.  ENMT: Lips without lesions, good dentition, " oropharynx clear.  Neck: Trachea midline, no masses.   Lymph: No cervical or supraclavicular lymphadenopathy  Respiratory: Unlabored respiratory effort, lungs appear clear, no wheezes.  Cardiovascular: Regular rate and rhythm.  Psych: Alert and oriented x3, normal affect and mood.        Assessment and Plan:   The following treatment plan was discussed    1. Puncture wound  Acute, new onset condition.  And looks good.  Scab noted today.  No infection.  Hopefully fatigue will improve in time.  He should be more active and exercise more routinely.  Weight loss would be helpful as well.    2. Leukocytosis, unspecified type  Acute, new onset condition secondary to infection.  Repeat CBC.  Likely will remain normal range.  - CBC WITH DIFFERENTIAL; Future    3. Decreased GFR  Could be secondary to fluid overload.  Will repeat BMP nonfasting in approximately 2 weeks.  - Basic Metabolic Panel; Future    4. Prediabetes  New condition noted in chart but chronic.  Advised to exercise routinely.  Lose weight.  Watch carbohydrate and sugar intake.  We will follow-up with labs in a year or less.         Followup: No follow-ups on file.    Please note that this dictation was created using voice recognition software. I have made every reasonable attempt to correct obvious errors, but I expect that there are errors of grammar and possibly content that I did not discover before finalizing the note.

## 2021-12-01 NOTE — ASSESSMENT & PLAN NOTE
A1c recently at 5.7.  4 days later in the hospital it was rechecked for some reason and was 5.8.  He is aware of this condition.  Plans to make some lifestyle modifications.

## 2022-04-10 ENCOUNTER — OFFICE VISIT (OUTPATIENT)
Dept: URGENT CARE | Facility: CLINIC | Age: 42
End: 2022-04-10
Payer: OTHER GOVERNMENT

## 2022-04-10 VITALS
WEIGHT: 245.5 LBS | SYSTOLIC BLOOD PRESSURE: 116 MMHG | BODY MASS INDEX: 35.15 KG/M2 | RESPIRATION RATE: 16 BRPM | TEMPERATURE: 97.3 F | HEIGHT: 70 IN | DIASTOLIC BLOOD PRESSURE: 72 MMHG | OXYGEN SATURATION: 96 % | HEART RATE: 70 BPM

## 2022-04-10 DIAGNOSIS — R09.81 NASAL CONGESTION: ICD-10-CM

## 2022-04-10 DIAGNOSIS — R51.9 SINUS HEADACHE: ICD-10-CM

## 2022-04-10 DIAGNOSIS — J01.40 ACUTE NON-RECURRENT PANSINUSITIS: ICD-10-CM

## 2022-04-10 DIAGNOSIS — R05.9 COUGH: ICD-10-CM

## 2022-04-10 DIAGNOSIS — J30.1 SEASONAL ALLERGIC RHINITIS DUE TO POLLEN: ICD-10-CM

## 2022-04-10 PROCEDURE — 99214 OFFICE O/P EST MOD 30 MIN: CPT | Performed by: NURSE PRACTITIONER

## 2022-04-10 RX ORDER — AMOXICILLIN AND CLAVULANATE POTASSIUM 875; 125 MG/1; MG/1
1 TABLET, FILM COATED ORAL 2 TIMES DAILY
Qty: 14 TABLET | Refills: 0 | Status: SHIPPED | OUTPATIENT
Start: 2022-04-10 | End: 2022-04-17

## 2022-04-10 ASSESSMENT — FIBROSIS 4 INDEX: FIB4 SCORE: 1.19

## 2022-04-10 NOTE — PROGRESS NOTES
"Subjective:   Brandon Polanco is a 41 y.o. male who presents for Sinus Problem (Sinus pressure, productive cough, fever x 1 week; similar symptoms 3 weeks ago )       HPI  Patient presents for evaluation of an approximate 2 to 3-week history of sinus pressure and pain, nasal congestion, cough, low-grade fever, and malaise.  Patient has known history of seasonal allergic rhinitis, has been taking Allegra, Claritin, Mucinex, and Flonase without significant relief.  Patient reports that his fibers have been ill with similar symptoms.    ROS  All other systems are negative except as documented above within HPI.    MEDS:   Current Outpatient Medications:   •  Fexofenadine-Pseudoephedrine (ALLEGRA-D 12 HOUR PO), Take  by mouth., Disp: , Rfl:   •  Ascorbic Acid (VITAMIN C PO), Take  by mouth., Disp: , Rfl:   ALLERGIES: No Known Allergies    Patient's PMH, SocHx, SurgHx, FamHx, Drug allergies and medications were reviewed.     Objective:   /72 (BP Location: Left arm, Patient Position: Sitting)   Pulse 70   Temp 36.3 °C (97.3 °F) (Temporal)   Resp 16   Ht 1.778 m (5' 10\")   Wt 111 kg (245 lb 8 oz)   SpO2 96%   BMI 35.23 kg/m²     Physical Exam  Vitals and nursing note reviewed.   Constitutional:       General: He is awake.      Appearance: Normal appearance. He is well-developed.   HENT:      Head: Normocephalic and atraumatic.      Right Ear: Tympanic membrane, ear canal and external ear normal.      Left Ear: Tympanic membrane, ear canal and external ear normal.      Nose: Congestion and rhinorrhea present.      Right Turbinates: Enlarged.      Left Turbinates: Enlarged.      Right Sinus: Frontal sinus tenderness present.      Left Sinus: Frontal sinus tenderness present.      Mouth/Throat:      Lips: Pink.      Mouth: Mucous membranes are moist.      Pharynx: Oropharynx is clear. Uvula midline. Posterior oropharyngeal erythema present. No oropharyngeal exudate.      Tonsils: No tonsillar exudate or tonsillar " abscesses.   Eyes:      Extraocular Movements: Extraocular movements intact.      Conjunctiva/sclera: Conjunctivae normal.   Neck:      Trachea: Trachea and phonation normal.   Cardiovascular:      Rate and Rhythm: Normal rate and regular rhythm.      Pulses: Normal pulses.      Heart sounds: Normal heart sounds.   Pulmonary:      Effort: Pulmonary effort is normal.      Breath sounds: Normal breath sounds and air entry. No stridor. No wheezing, rhonchi or rales.   Musculoskeletal:         General: Normal range of motion.      Cervical back: Normal range of motion and neck supple.   Lymphadenopathy:      Head:      Right side of head: Tonsillar adenopathy present.      Left side of head: Tonsillar adenopathy present.   Skin:     General: Skin is warm and dry.      Capillary Refill: Capillary refill takes less than 2 seconds.   Neurological:      Mental Status: He is alert and oriented to person, place, and time.   Psychiatric:         Mood and Affect: Mood normal.         Behavior: Behavior is cooperative.         Thought Content: Thought content normal.         Assessment/Plan:   Assessment    1. Acute non-recurrent pansinusitis  - amoxicillin-clavulanate (AUGMENTIN) 875-125 MG Tab; Take 1 Tablet by mouth 2 times a day for 7 days.  Dispense: 14 Tablet; Refill: 0    2. Seasonal allergic rhinitis due to pollen    3. Nasal congestion    4. Sinus headache    5. Cough      Vital signs stable at today's acute urgent care visit. Begin medications as listed. Discussed management options as indicated.    Advised the patient to follow-up with the primary care provider for recheck, reevaluation, and/or consideration of further management. Return to urgent care with any worsening/continued symptoms.  Red flags discussed and indications to immediately call 911 or present to the ED.  All questions were encouraged and answered to the patient's satisfaction and understanding, and they agree to the plan of care.     I personally  reviewed prior external notes and test results pertinent to today's visit.  I have independently reviewed and interpreted all diagnostics ordered during this urgent care acute visit. Time spent evaluating this patient was a minimum of 30 minutes and includes preparing for visit, counseling/education, exam, evaluation, obtaining history, and ordering lab/test/procedures.      Please note that this dictation was created using voice recognition software. I have made a reasonable attempt to correct obvious errors, but I expect that there are errors of grammar and possibly content that I did not discover before finalizing the note.

## 2022-05-15 ENCOUNTER — OFFICE VISIT (OUTPATIENT)
Dept: URGENT CARE | Facility: CLINIC | Age: 42
End: 2022-05-15
Payer: OTHER GOVERNMENT

## 2022-05-15 VITALS
DIASTOLIC BLOOD PRESSURE: 64 MMHG | HEIGHT: 71 IN | BODY MASS INDEX: 33.6 KG/M2 | HEART RATE: 86 BPM | SYSTOLIC BLOOD PRESSURE: 110 MMHG | OXYGEN SATURATION: 94 % | WEIGHT: 240 LBS | RESPIRATION RATE: 16 BRPM | TEMPERATURE: 97 F

## 2022-05-15 DIAGNOSIS — J11.1 INFLUENZA: ICD-10-CM

## 2022-05-15 PROCEDURE — 99214 OFFICE O/P EST MOD 30 MIN: CPT | Performed by: NURSE PRACTITIONER

## 2022-05-15 RX ORDER — OSELTAMIVIR PHOSPHATE 75 MG/1
75 CAPSULE ORAL 2 TIMES DAILY
Qty: 10 CAPSULE | Refills: 0 | Status: SHIPPED | OUTPATIENT
Start: 2022-05-15 | End: 2024-03-19

## 2022-05-15 ASSESSMENT — FIBROSIS 4 INDEX: FIB4 SCORE: 1.19

## 2022-05-15 NOTE — PROGRESS NOTES
Chief Complaint   Patient presents with   • Cough     Flu like symptoms, fever of 102.0 and cough for 3 days       HISTORY OF PRESENT ILLNESS: Patient is a pleasant 41 y.o. male who presents today due to symptoms which started two days ago with sudden onset. Pt reports a fever, chills, body aches. Reports associated cough, sore throat, nasal congestion, headache, and fatigue. Denies blood in sputum, chest pain, shortness of breath, wheezing, nausea, vomiting, or diarrhea. Denies h/o asthma/copd/CAP. No immunocompromise. Has tried OTC cold/flu medications without significant relief of symptoms. No recent ABX use. No other aggravating or alleviating factors.  His daughter has recently been diagnosed with influenza.      Patient Active Problem List    Diagnosis Date Noted   • Prediabetes 2021   • Puncture wound 2021   • Leukocytosis 2021   • Frontal sinusitis 2021   • Obesity (BMI 30-39.9) 2021   • Family history of breast cancer in mother 2018   • FHx: BRCA2 gene positive 2018       Allergies:Patient has no known allergies.    Current Outpatient Medications Ordered in Epic   Medication Sig Dispense Refill   • oseltamivir (TAMIFLU) 75 MG Cap Take 1 Capsule by mouth in the morning and 1 Capsule in the evening. 10 Capsule 0   • Fexofenadine-Pseudoephedrine (ALLEGRA-D 12 HOUR PO) Take  by mouth.     • Ascorbic Acid (VITAMIN C PO) Take  by mouth.       No current Epic-ordered facility-administered medications on file.       Past Medical History:   Diagnosis Date   • Patient denies medical problems        Social History     Tobacco Use   • Smoking status: Never Smoker   • Smokeless tobacco: Never Used   Vaping Use   • Vaping Use: Never used   Substance Use Topics   • Alcohol use: Yes     Alcohol/week: 0.0 oz     Comment: 1 glass whiskey with dinner 6 d/wk   • Drug use: No       Family Status   Relation Name Status   • Mo     • Fa   at age 36        Sepsis 2nd drug  "overdose   • Bro  Alive   • MGMo  (Not Specified)   • MGFa  (Not Specified)   • PGMo  (Not Specified)   • PGFa  (Not Specified)   • Fanny  Alive   • Fanny  Alive   • Neg Hx  (Not Specified)     Family History   Problem Relation Age of Onset   • Cancer Mother         breast- met   • Heart Disease Maternal Grandmother    • Heart Disease Maternal Grandfather    • Heart Disease Paternal Grandmother    • Heart Disease Paternal Grandfather    • Diabetes Neg Hx    • Stroke Neg Hx        ROS:  Review of Systems   Constitutional: Positive for subjective fever, chills, fatigue, malaise. Negative for weight loss.  HENT: Positive for congestion and sore throat. Negative for ear pain, nosebleeds, and neck pain.    Eyes: Negative for vision changes.   Cardiovascular: Negative for chest pain, palpitations, orthopnea and leg swelling.   Respiratory: Positive for cough. Negative for shortness of breath and wheezing.   Gastrointestinal: Negative for abdominal pain, nausea, vomiting or diarrhea.   Skin: Negative for rash, diaphoresis.     Exam:  /64 (BP Location: Left arm, Patient Position: Sitting, BP Cuff Size: Adult)   Pulse 86   Temp 36.1 °C (97 °F) (Temporal)   Resp 16   Ht 1.803 m (5' 11\")   Wt 109 kg (240 lb)   SpO2 94%   General: well-nourished, well-developed male, appears uncomfortable, non-toxic in appearance.   Head: normocephalic, atraumatic.  Eyes: PERRLA, EOM within normal limits, no conjunctival injection, no scleral icterus, visual fields and acuity grossly intact.  Ears: normal shape and symmetry, no tenderness, no discharge. External canals are without any significant edema or erythema. Tympanic membranes are without any inflammation, no effusion. Gross auditory acuity is intact.  Nose: symmetrical without tenderness, mild discharge, erythema present bilateral nares.  Mouth/Throat: reasonable hygiene, no exudates or tonsillar enlargement. Erythema present.   Neck: no masses, range of motion within normal " limits, no tracheal deviation.  Lymph: mild cervical adenopathy. No supraclavicular adenopathy.   Neuro: alert and oriented. Cranial nerves 1-12 grossly intact.   Cardiovascular: Regular rate and regular rhythm without murmurs, rubs, or gallops. No edema.   Pulmonary: no distress. Chest is symmetrical with respiration, no wheezes, crackles, or rhonchi.   Musculoskeletal: appropriate muscle tone, gait is stable.  Skin: warm, dry, intact, no clubbing, no cyanosis.   Psych: appropriate mood, affect, judgement.           Assessment/Plan:  1. Influenza  oseltamivir (TAMIFLU) 75 MG Cap         Patient presents with influenza-like symptoms, recently exposed to influenza, influenza most likely.  Tamiflu provided. Discussed viral etiology, self limiting illness. Did not see any evidence of a bacterial process. Discussed natural progression and sx care. Rest, increase fluid intake, hand and respiratory hygiene. OTC cough/cold medications as directed.   Supportive care, differential diagnoses, and indications for immediate follow-up discussed with patient.   Pathogenesis of diagnosis discussed including typical length and natural progression.   Instructed to return to clinic or nearest emergency department for any change in condition, further concerns, or worsening of symptoms.  Patient states understanding of the plan of care and discharge instructions.  Instructed to make an appointment, for follow up, with his primary care provider.            Please note that this dictation was created using voice recognition software. I have made every reasonable attempt to correct obvious errors, but I expect that there are errors of grammar and possibly content that I did not discover before finalizing the note. N95 and safety glasses used for entire visit.         MISSY Napoles.

## 2022-05-15 NOTE — LETTER
May 15, 2022         Patient: Brandon Polanco   YOB: 1980   Date of Visit: 5/15/2022           To Whom it May Concern:    Brandon Polanco was seen in my clinic on 5/15/2022. He has been diagnosed with influenza, please excuse him from work until he is better and has been fever free for 24 hours.     If you have any questions or concerns, please don't hesitate to call.        Sincerely,           MISSY Napoles.  Electronically Signed

## 2024-03-19 ENCOUNTER — OFFICE VISIT (OUTPATIENT)
Dept: URGENT CARE | Facility: CLINIC | Age: 44
End: 2024-03-19
Payer: OTHER GOVERNMENT

## 2024-03-19 VITALS
TEMPERATURE: 98.8 F | OXYGEN SATURATION: 95 % | BODY MASS INDEX: 35.07 KG/M2 | SYSTOLIC BLOOD PRESSURE: 114 MMHG | WEIGHT: 245 LBS | DIASTOLIC BLOOD PRESSURE: 68 MMHG | HEIGHT: 70 IN | HEART RATE: 75 BPM | RESPIRATION RATE: 16 BRPM

## 2024-03-19 DIAGNOSIS — H65.02 ACUTE SEROUS OTITIS MEDIA OF LEFT EAR, RECURRENCE NOT SPECIFIED: ICD-10-CM

## 2024-03-19 DIAGNOSIS — J06.9 UPPER RESPIRATORY TRACT INFECTION, UNSPECIFIED TYPE: ICD-10-CM

## 2024-03-19 LAB
FLUAV RNA SPEC QL NAA+PROBE: NEGATIVE
FLUBV RNA SPEC QL NAA+PROBE: NEGATIVE
RSV RNA SPEC QL NAA+PROBE: NEGATIVE
SARS-COV-2 RNA RESP QL NAA+PROBE: NEGATIVE

## 2024-03-19 PROCEDURE — 3078F DIAST BP <80 MM HG: CPT | Performed by: PHYSICIAN ASSISTANT

## 2024-03-19 PROCEDURE — 99213 OFFICE O/P EST LOW 20 MIN: CPT | Performed by: PHYSICIAN ASSISTANT

## 2024-03-19 PROCEDURE — 3074F SYST BP LT 130 MM HG: CPT | Performed by: PHYSICIAN ASSISTANT

## 2024-03-19 PROCEDURE — 0241U POCT CEPHEID COV-2, FLU A/B, RSV - PCR: CPT | Performed by: PHYSICIAN ASSISTANT

## 2024-03-19 RX ORDER — AMOXICILLIN 875 MG/1
875 TABLET, COATED ORAL 2 TIMES DAILY
Qty: 14 TABLET | Refills: 0 | Status: SHIPPED | OUTPATIENT
Start: 2024-03-19 | End: 2024-03-26

## 2024-03-19 ASSESSMENT — ENCOUNTER SYMPTOMS
SHORTNESS OF BREATH: 0
HEADACHES: 0
VOMITING: 0
FEVER: 1
NAUSEA: 0
MYALGIAS: 1
DIARRHEA: 0
SORE THROAT: 0
COUGH: 1
CHILLS: 1
WHEEZING: 0
ABDOMINAL PAIN: 0
SPUTUM PRODUCTION: 0

## 2024-03-19 NOTE — PROGRESS NOTES
Subjective     Brandon Polanco is a 43 y.o. male who presents with Otalgia (L ear feeling pressure x 1 day ), Cough (Wet coughing,  runny nose  x 3 days ), and Fever            Fever   This is a new problem. Episode onset: 3 days. The problem occurs intermittently. The problem has been waxing and waning. The maximum temperature noted was 100 to 100.9 F. Associated symptoms include congestion, coughing, ear pain (left ear pain started last night.) and muscle aches. Pertinent negatives include no abdominal pain, chest pain, diarrhea, headaches, nausea, sleepiness, sore throat, vomiting or wheezing. He has tried nothing for the symptoms.   Risk factors: no sick contacts        Past Medical History:   Diagnosis Date    Patient denies medical problems        Past Surgical History:   Procedure Laterality Date    EYE SURGERY      lasik    VASECTOMY         Family History   Problem Relation Age of Onset    Cancer Mother         breast- met    Heart Disease Maternal Grandmother     Heart Disease Maternal Grandfather     Heart Disease Paternal Grandmother     Heart Disease Paternal Grandfather     Diabetes Neg Hx     Stroke Neg Hx        Patient has no known allergies.      Medications, Allergies, and current problem list reviewed today in Epic      Review of Systems   Constitutional:  Positive for chills, fever and malaise/fatigue.   HENT:  Positive for congestion, ear pain (left ear pain started last night.) and hearing loss. Negative for sore throat.    Respiratory:  Positive for cough. Negative for sputum production, shortness of breath and wheezing.    Cardiovascular:  Negative for chest pain and leg swelling.   Gastrointestinal:  Negative for abdominal pain, diarrhea, nausea and vomiting.   Musculoskeletal:  Positive for myalgias.   Neurological:  Negative for headaches.        All other systems reviewed and are negative.         Objective     /68   Pulse 75   Temp 37.1 °C (98.8 °F) (Temporal)   Resp 16   Ht  "1.778 m (5' 10\")   Wt 111 kg (245 lb)   SpO2 95%   BMI 35.15 kg/m²      Physical Exam  Constitutional:       General: He is not in acute distress.     Appearance: He is not ill-appearing.   HENT:      Head: Normocephalic and atraumatic.      Right Ear: Tympanic membrane, ear canal and external ear normal.      Left Ear: Ear canal and external ear normal. A middle ear effusion is present. Tympanic membrane is injected.      Nose: Congestion and rhinorrhea present.      Mouth/Throat:      Mouth: Mucous membranes are moist.      Pharynx: No posterior oropharyngeal erythema.   Eyes:      Conjunctiva/sclera: Conjunctivae normal.   Cardiovascular:      Rate and Rhythm: Normal rate and regular rhythm.      Heart sounds: Normal heart sounds. No murmur heard.  Pulmonary:      Effort: Pulmonary effort is normal. No respiratory distress.      Breath sounds: Normal breath sounds. No stridor. No wheezing, rhonchi or rales.   Lymphadenopathy:      Cervical: No cervical adenopathy.   Skin:     General: Skin is warm and dry.   Neurological:      General: No focal deficit present.      Mental Status: He is alert and oriented to person, place, and time.   Psychiatric:         Mood and Affect: Mood normal.         Behavior: Behavior normal.         Thought Content: Thought content normal.         Judgment: Judgment normal.                             Assessment & Plan        1. Acute serous otitis media of left ear, recurrence not specified    - POCT Cepheid CoV-2, Flu A/B, RSV - PCR  - amoxicillin (AMOXIL) 875 MG tablet; Take 1 Tablet by mouth 2 times a day for 7 days.  Dispense: 14 Tablet; Refill: 0    2. Upper respiratory tract infection, unspecified type    - POCT Cepheid CoV-2, Flu A/B, RSV - PCR- negative    Differential diagnoses, Supportive care, and indications for immediate follow-up discussed with patient.   Pathogenesis of diagnosis discussed including typical length and natural progression.   Instructed to return to " clinic or nearest emergency department for any change in condition, further concerns, or worsening of symptoms.      The patient demonstrated a good understanding and agreed with the treatment plan.    Deloris Fowler P.A.-C.

## 2024-04-26 ENCOUNTER — OFFICE VISIT (OUTPATIENT)
Dept: MEDICAL GROUP | Facility: MEDICAL CENTER | Age: 44
End: 2024-04-26
Payer: OTHER GOVERNMENT

## 2024-04-26 VITALS
HEART RATE: 86 BPM | HEIGHT: 70 IN | TEMPERATURE: 97.1 F | SYSTOLIC BLOOD PRESSURE: 122 MMHG | BODY MASS INDEX: 34.36 KG/M2 | RESPIRATION RATE: 16 BRPM | WEIGHT: 240 LBS | DIASTOLIC BLOOD PRESSURE: 68 MMHG | OXYGEN SATURATION: 94 %

## 2024-04-26 DIAGNOSIS — Z00.00 PREVENTATIVE HEALTH CARE: ICD-10-CM

## 2024-04-26 DIAGNOSIS — R05.1 ACUTE COUGH: ICD-10-CM

## 2024-04-26 DIAGNOSIS — J30.2 SEASONAL ALLERGIES: ICD-10-CM

## 2024-04-26 PROBLEM — T14.8XXA PUNCTURE WOUND: Status: RESOLVED | Noted: 2021-11-22 | Resolved: 2024-04-26

## 2024-04-26 PROBLEM — E55.9 HYPOVITAMINOSIS D: Status: ACTIVE | Noted: 2024-04-26

## 2024-04-26 PROCEDURE — 99214 OFFICE O/P EST MOD 30 MIN: CPT | Performed by: PHYSICIAN ASSISTANT

## 2024-04-26 PROCEDURE — 3078F DIAST BP <80 MM HG: CPT | Performed by: PHYSICIAN ASSISTANT

## 2024-04-26 PROCEDURE — 3074F SYST BP LT 130 MM HG: CPT | Performed by: PHYSICIAN ASSISTANT

## 2024-04-26 RX ORDER — ALBUTEROL SULFATE 90 UG/1
2 AEROSOL, METERED RESPIRATORY (INHALATION) EVERY 4 HOURS PRN
Qty: 1 EACH | Refills: 5 | Status: SHIPPED | OUTPATIENT
Start: 2024-04-26 | End: 2024-05-26

## 2024-04-26 ASSESSMENT — PATIENT HEALTH QUESTIONNAIRE - PHQ9: CLINICAL INTERPRETATION OF PHQ2 SCORE: 0

## 2024-04-26 NOTE — PROGRESS NOTES
"Subjective:     History of Present Illness  The patient presents for evaluation of multiple medical concerns.    The patient has been experiencing a persistent cough for the past week, which initially manifested in the lower chest region. The severity of the cough escalated yesterday, leading to respiratory distress and a sensation of throat constriction. He reported a fever of 100.1 degrees yesterday. His wife had a similar illness earlier this month, which was treated with two courses of antibiotics, and has since recovered. The patient denies any recent travel, headaches, nausea, or vomiting. He manages his seasonal allergies with Allegra-D 12-hour, which he takes concurrently with Mucinex for cough relief. However, this has not provided relief. He utilized two puffs of his albuterol inhaler yesterday to manage his breathing difficulties. He has a history of exercise-induced asthma, although he has not been formally diagnosed. He experiences wheezing during physical exertion.    Supplemental Information  He denies being depressed.      Current medicines (including changes today)  Current Outpatient Medications   Medication Sig Dispense Refill    Fexofenadine-Pseudoephedrine (ALLEGRA-D 12 HOUR PO) Take 1 Tablet by mouth every 12 hours.      albuterol 108 (90 Base) MCG/ACT Aero Soln inhalation aerosol Inhale 2 Puffs every four hours as needed for Shortness of Breath for up to 30 days. 1 Each 5     No current facility-administered medications for this visit.     He  has a past medical history of Patient denies medical problems.    He has no past medical history of Allergy or ASTHMA.    ROS   No chest pain, no shortness of breath, no abdominal pain  Positive ROS as per HPI.  All other systems reviewed and are negative.     Objective:     /68   Pulse 86   Temp 36.2 °C (97.1 °F)   Resp 16   Ht 1.778 m (5' 10\")   Wt 109 kg (240 lb)   SpO2 94%  Body mass index is 34.44 kg/m².   Physical " Exam    Constitutional: Alert, no distress.  Skin: Warm, dry, good turgor, no rashes in visible areas.  Eye: Equal, round and reactive, conjunctiva clear, lids normal.  ENMT: Lips without lesions, good dentition, oropharynx clear.  Neck: Trachea midline, no masses, no thyromegaly.   Respiratory: Unlabored respiratory effort, lungs clear to auscultation, no wheezes, no ronchi.  Psych: Alert and oriented x3, normal affect and mood.      Results  Laboratory Studies  Vitamin D level was low.        Assessment and Plan:   The following treatment plan was discussed    Assessment & Plan  1. Acute cough.  The acute cough is likely a viral process, which is self-limiting. The patient's lungs are clear. The patient is advised to maintain adequate hydration and continue with over-the-counter medications such as Mucinex. The patient is advised to seek follow-up care if symptoms worsen, such as fever, shortness of breath, or chest pain.    2. Seasonal allergies.  The patient is to continue with the rescue inhaler, which has been renewed, and continue with Allegra-D.    3. Preventative healthcare.  The patient has a medical history of prediabetes, with the last A1c test conducted in 2011. Fasting labs have been ordered, and the patient will be contacted with the results.      ORDERS:  1. Acute cough    - albuterol 108 (90 Base) MCG/ACT Aero Soln inhalation aerosol; Inhale 2 Puffs every four hours as needed for Shortness of Breath for up to 30 days.  Dispense: 1 Each; Refill: 5    2. Seasonal allergies      3. Preventative health care    - CBC WITH DIFFERENTIAL; Future  - Comp Metabolic Panel; Future  - HEMOGLOBIN A1C; Future  - Lipid Profile; Future  - TSH WITH REFLEX TO FT4; Future        Please note that this dictation was created using voice recognition software. I have made every reasonable attempt to correct obvious errors, but I expect that there are errors of grammar and possibly content that I did not discover before  finalizing the note.      Attestation      Verbal consent was acquired by the patient to use [employer] ambient listening note generation during this visit Yes

## 2024-05-10 ENCOUNTER — OFFICE VISIT (OUTPATIENT)
Dept: URGENT CARE | Facility: CLINIC | Age: 44
End: 2024-05-10
Payer: OTHER GOVERNMENT

## 2024-05-10 VITALS
RESPIRATION RATE: 16 BRPM | HEIGHT: 70 IN | HEART RATE: 101 BPM | OXYGEN SATURATION: 95 % | DIASTOLIC BLOOD PRESSURE: 74 MMHG | WEIGHT: 245 LBS | TEMPERATURE: 97.2 F | SYSTOLIC BLOOD PRESSURE: 116 MMHG | BODY MASS INDEX: 35.07 KG/M2

## 2024-05-10 DIAGNOSIS — R05.8 POST-VIRAL COUGH SYNDROME: ICD-10-CM

## 2024-05-10 DIAGNOSIS — J03.00 STREP TONSILLITIS: ICD-10-CM

## 2024-05-10 LAB
FLUAV RNA SPEC QL NAA+PROBE: NEGATIVE
FLUBV RNA SPEC QL NAA+PROBE: NEGATIVE
RSV RNA SPEC QL NAA+PROBE: NEGATIVE
S PYO DNA SPEC NAA+PROBE: DETECTED
SARS-COV-2 RNA RESP QL NAA+PROBE: NEGATIVE

## 2024-05-10 PROCEDURE — 0241U POCT CEPHEID COV-2, FLU A/B, RSV - PCR: CPT | Performed by: NURSE PRACTITIONER

## 2024-05-10 PROCEDURE — 3074F SYST BP LT 130 MM HG: CPT | Performed by: NURSE PRACTITIONER

## 2024-05-10 PROCEDURE — 87651 STREP A DNA AMP PROBE: CPT | Performed by: NURSE PRACTITIONER

## 2024-05-10 PROCEDURE — 99213 OFFICE O/P EST LOW 20 MIN: CPT | Performed by: NURSE PRACTITIONER

## 2024-05-10 PROCEDURE — 3078F DIAST BP <80 MM HG: CPT | Performed by: NURSE PRACTITIONER

## 2024-05-10 RX ORDER — AMOXICILLIN 500 MG/1
500 CAPSULE ORAL 2 TIMES DAILY
Qty: 20 CAPSULE | Refills: 0 | Status: SHIPPED | OUTPATIENT
Start: 2024-05-10 | End: 2024-05-20

## 2024-05-10 RX ORDER — DEXAMETHASONE SODIUM PHOSPHATE 10 MG/ML
10 INJECTION INTRAMUSCULAR; INTRAVENOUS ONCE
Status: COMPLETED | OUTPATIENT
Start: 2024-05-10 | End: 2024-05-10

## 2024-05-10 RX ADMIN — DEXAMETHASONE SODIUM PHOSPHATE 10 MG: 10 INJECTION INTRAMUSCULAR; INTRAVENOUS at 08:49

## 2024-05-10 ASSESSMENT — ENCOUNTER SYMPTOMS
SHORTNESS OF BREATH: 1
COUGH: 1
DIARRHEA: 0
SPUTUM PRODUCTION: 1
WHEEZING: 1
HEMOPTYSIS: 0
SORE THROAT: 1
FEVER: 1
NAUSEA: 1
VOMITING: 0

## 2024-05-10 NOTE — PROGRESS NOTES
"  Subjective:     Brandon Polanco is a 43 y.o. male who presents for Fever (Patient coming for possible strep and fever 102, coughing )      Sore throat started on Tuesday. States he had a on ongoing cough with clear sputum for several week, had URI symptoms \"mid to end\" of April. \"Felt good, then started to feel sick again\". Took Tylenol.     Fever   This is a new problem. The current episode started in the past 7 days. Associated symptoms include coughing, nausea, a sore throat and wheezing. Pertinent negatives include no diarrhea or vomiting.       Past Medical History:   Diagnosis Date    Patient denies medical problems        Past Surgical History:   Procedure Laterality Date    EYE SURGERY      lasik    VASECTOMY         Social History     Socioeconomic History    Marital status:      Spouse name: Not on file    Number of children: Not on file    Years of education: Not on file    Highest education level: Not on file   Occupational History    Not on file   Tobacco Use    Smoking status: Never    Smokeless tobacco: Never   Vaping Use    Vaping Use: Never used   Substance and Sexual Activity    Alcohol use: Yes     Alcohol/week: 0.0 oz     Comment: 1 glass whiskey with dinner 6 d/wk    Drug use: No    Sexual activity: Yes     Partners: Female     Birth control/protection: Surgical     Comment: , 2 daughters, [employer]   Other Topics Concern    Not on file   Social History Narrative    Not on file     Social Determinants of Health     Financial Resource Strain: Not on file   Food Insecurity: Not on file   Transportation Needs: Not on file   Physical Activity: Not on file   Stress: Not on file   Social Connections: Not on file   Intimate Partner Violence: Not on file   Housing Stability: Not on file        Family History   Problem Relation Age of Onset    Cancer Mother         breast- met    Heart Disease Maternal Grandmother     Heart Disease Maternal Grandfather     Heart Disease Paternal Grandmother  " "   Heart Disease Paternal Grandfather     Diabetes Neg Hx     Stroke Neg Hx         No Known Allergies    Review of Systems   Constitutional:  Positive for fever and malaise/fatigue.   HENT:  Positive for sore throat.    Respiratory:  Positive for cough, sputum production, shortness of breath and wheezing. Negative for hemoptysis.    Gastrointestinal:  Positive for nausea. Negative for diarrhea and vomiting.   All other systems reviewed and are negative.       Objective:   /74   Pulse (!) 101   Temp 36.2 °C (97.2 °F) (Temporal)   Resp 16   Ht 1.778 m (5' 10\")   Wt 111 kg (245 lb)   SpO2 95%   BMI 35.15 kg/m²     Physical Exam  Vitals reviewed.   Constitutional:       General: He is not in acute distress.     Appearance: He is well-developed. He is not toxic-appearing.   HENT:      Head: Normocephalic and atraumatic.      Right Ear: External ear normal. Tympanic membrane is not erythematous.      Left Ear: External ear normal. Tympanic membrane is not erythematous.      Nose: Mucosal edema present.      Mouth/Throat:      Lips: Pink.      Mouth: Mucous membranes are moist.      Pharynx: Uvula midline. Posterior oropharyngeal erythema present.      Tonsils: Tonsillar exudate present. No tonsillar abscesses. 2+ on the right. 2+ on the left.   Eyes:      Conjunctiva/sclera: Conjunctivae normal.   Cardiovascular:      Rate and Rhythm: Normal rate.   Pulmonary:      Effort: Pulmonary effort is normal. No tachypnea, bradypnea, accessory muscle usage, prolonged expiration or respiratory distress.      Breath sounds: Normal breath sounds. No stridor. No decreased breath sounds, wheezing, rhonchi or rales.      Comments: Cough noted.   Musculoskeletal:      Cervical back: Neck supple.   Lymphadenopathy:      Head:      Right side of head: Tonsillar adenopathy present.      Left side of head: Tonsillar adenopathy present.   Skin:     General: Skin is warm and dry.      Findings: No rash.   Neurological:      " Mental Status: He is alert and oriented to person, place, and time.      GCS: GCS eye subscore is 4. GCS verbal subscore is 5. GCS motor subscore is 6.   Psychiatric:         Speech: Speech normal.         Behavior: Behavior normal.         Thought Content: Thought content normal.         Judgment: Judgment normal.         Assessment/Plan:   1. Strep tonsillitis  - POCT Cepheid Group A Strep - PCR  - dexamethasone (Decadron) injection (check route below) 10 mg  - amoxicillin (AMOXIL) 500 MG Cap; Take 1 Capsule by mouth 2 times a day for 10 days.  Dispense: 20 Capsule; Refill: 0    2. Post-viral cough syndrome  - POCT Cepheid CoV-2, Flu A/B, RSV - PCR    Results for orders placed or performed in visit on 05/10/24   POCT Cepheid CoV-2, Flu A/B, RSV - PCR   Result Value Ref Range    SARS-CoV-2 by PCR Negative Negative, Invalid    Influenza virus A RNA Negative Negative, Invalid    Influenza virus B, PCR Negative Negative, Invalid    RSV, PCR Negative Negative, Invalid   POCT Cepheid Group A Strep - PCR   Result Value Ref Range    POC Group A Strep, PCR Detected (A) Not Detected, Invalid     Symptomatic care.  -Oral hydration and rest.   -Cough control: nonpharmacologic options for cough relief such as throat lozenges, hot tea, honey.  -Over the counter expectorant as directed; Guaifenesin (Mucinex).  -Tylenol or ibuprofen for pain and fever as directed.   -Warm salt water gargles.  -OTC Throat lozenges or spray (Cepacol).  -Throw away toothbrush after 24 hrs on antibiotics, replace with new one.    Seek emergency medical care immediately for: Trouble breathing, drooling, persistent pain or pressure in the chest, confusion, inability to wake or stay awake, bluish lips or face, persistent tachycardia (fast heart rate), prolonged dizziness, persistent high grade fevers. Follow up for prolonged cough, persistent wheezing, persistent throat pain, difficulty swallowing, persistent fevers, leg swelling, or any other concerns.  Follow up with your Primary Care Provider.     -Stable Vitals. Clear airway. Spouse had also tested positive for strep. Discussed S&S of PNA with follow up.     Differential diagnosis, natural history, supportive care, and indications for immediate follow-up discussed.

## 2024-05-10 NOTE — PATIENT INSTRUCTIONS
Symptomatic care.  -Oral hydration and rest.   -Cough control: nonpharmacologic options for cough relief such as throat lozenges, hot tea, honey.  -Over the counter expectorant as directed; Guaifenesin (Mucinex).  -Tylenol or ibuprofen for pain and fever as directed.   -Warm salt water gargles.  -OTC Throat lozenges or spray (Cepacol).  -Throw away toothbrush after 24 hrs on antibiotics, replace with new one.    Seek emergency medical care immediately for: Trouble breathing, drooling, persistent pain or pressure in the chest, confusion, inability to wake or stay awake, bluish lips or face, persistent tachycardia (fast heart rate), prolonged dizziness, persistent high grade fevers. Follow up for prolonged cough, persistent wheezing, persistent throat pain, difficulty swallowing, persistent fevers, leg swelling, or any other concerns. Follow up with your Primary Care Provider.

## 2024-05-22 ENCOUNTER — APPOINTMENT (OUTPATIENT)
Dept: RADIOLOGY | Facility: MEDICAL CENTER | Age: 44
End: 2024-05-22
Attending: EMERGENCY MEDICINE
Payer: OTHER GOVERNMENT

## 2024-05-22 ENCOUNTER — HOSPITAL ENCOUNTER (EMERGENCY)
Facility: MEDICAL CENTER | Age: 44
End: 2024-05-22
Attending: EMERGENCY MEDICINE
Payer: OTHER GOVERNMENT

## 2024-05-22 VITALS
RESPIRATION RATE: 19 BRPM | HEART RATE: 91 BPM | HEIGHT: 71 IN | SYSTOLIC BLOOD PRESSURE: 147 MMHG | OXYGEN SATURATION: 91 % | WEIGHT: 250.44 LBS | DIASTOLIC BLOOD PRESSURE: 83 MMHG | TEMPERATURE: 97.9 F | BODY MASS INDEX: 35.06 KG/M2

## 2024-05-22 DIAGNOSIS — R07.2 PRECORDIAL CHEST PAIN: ICD-10-CM

## 2024-05-22 LAB
ALBUMIN SERPL BCP-MCNC: 4.1 G/DL (ref 3.2–4.9)
ALBUMIN/GLOB SERPL: 1.3 G/DL
ALP SERPL-CCNC: 81 U/L (ref 30–99)
ALT SERPL-CCNC: 30 U/L (ref 2–50)
ANION GAP SERPL CALC-SCNC: 13 MMOL/L (ref 7–16)
AST SERPL-CCNC: 22 U/L (ref 12–45)
BASOPHILS # BLD AUTO: 0.3 % (ref 0–1.8)
BASOPHILS # BLD: 0.08 K/UL (ref 0–0.12)
BILIRUB SERPL-MCNC: 0.4 MG/DL (ref 0.1–1.5)
BUN SERPL-MCNC: 18 MG/DL (ref 8–22)
CALCIUM ALBUM COR SERPL-MCNC: 9 MG/DL (ref 8.5–10.5)
CALCIUM SERPL-MCNC: 9.1 MG/DL (ref 8.5–10.5)
CHLORIDE SERPL-SCNC: 103 MMOL/L (ref 96–112)
CO2 SERPL-SCNC: 22 MMOL/L (ref 20–33)
CREAT SERPL-MCNC: 1.02 MG/DL (ref 0.5–1.4)
EKG IMPRESSION: NORMAL
EOSINOPHIL # BLD AUTO: 0.07 K/UL (ref 0–0.51)
EOSINOPHIL NFR BLD: 0.3 % (ref 0–6.9)
ERYTHROCYTE [DISTWIDTH] IN BLOOD BY AUTOMATED COUNT: 39.5 FL (ref 35.9–50)
GFR SERPLBLD CREATININE-BSD FMLA CKD-EPI: 94 ML/MIN/1.73 M 2
GLOBULIN SER CALC-MCNC: 3.2 G/DL (ref 1.9–3.5)
GLUCOSE SERPL-MCNC: 104 MG/DL (ref 65–99)
HCT VFR BLD AUTO: 48.6 % (ref 42–52)
HGB BLD-MCNC: 16 G/DL (ref 14–18)
IMM GRANULOCYTES # BLD AUTO: 0.18 K/UL (ref 0–0.11)
IMM GRANULOCYTES NFR BLD AUTO: 0.7 % (ref 0–0.9)
LYMPHOCYTES # BLD AUTO: 2.04 K/UL (ref 1–4.8)
LYMPHOCYTES NFR BLD: 8 % (ref 22–41)
MCH RBC QN AUTO: 29.9 PG (ref 27–33)
MCHC RBC AUTO-ENTMCNC: 32.9 G/DL (ref 32.3–36.5)
MCV RBC AUTO: 90.7 FL (ref 81.4–97.8)
MONOCYTES # BLD AUTO: 1.81 K/UL (ref 0–0.85)
MONOCYTES NFR BLD AUTO: 7.1 % (ref 0–13.4)
NEUTROPHILS # BLD AUTO: 21.26 K/UL (ref 1.82–7.42)
NEUTROPHILS NFR BLD: 83.6 % (ref 44–72)
NRBC # BLD AUTO: 0 K/UL
NRBC BLD-RTO: 0 /100 WBC (ref 0–0.2)
PLATELET # BLD AUTO: 227 K/UL (ref 164–446)
PMV BLD AUTO: 11.5 FL (ref 9–12.9)
POTASSIUM SERPL-SCNC: 4.5 MMOL/L (ref 3.6–5.5)
PROT SERPL-MCNC: 7.3 G/DL (ref 6–8.2)
RBC # BLD AUTO: 5.36 M/UL (ref 4.7–6.1)
SODIUM SERPL-SCNC: 138 MMOL/L (ref 135–145)
TROPONIN T SERPL-MCNC: <6 NG/L (ref 6–19)
WBC # BLD AUTO: 25.4 K/UL (ref 4.8–10.8)

## 2024-05-22 RX ORDER — ASPIRIN 81 MG/1
324 TABLET, CHEWABLE ORAL ONCE
Status: DISCONTINUED | OUTPATIENT
Start: 2024-05-22 | End: 2024-05-22 | Stop reason: HOSPADM

## 2024-05-22 ASSESSMENT — HEART SCORE
ECG: NORMAL
AGE: <45
HEART SCORE: 3
RISK FACTORS: 1-2 RISK FACTORS
TROPONIN: LESS THAN OR EQUAL TO NORMAL LIMIT
HISTORY: HIGHLY SUSPICIOUS

## 2024-05-23 NOTE — ED NOTES
Received pt. Awake on bed, Aox4, complains 2/10 chest pain from 8/10.     Pt denies discomfort at this time.     Attached to cardiac monitor.    Given the call light and instructed to call for any assistance needed/ or concerns.   Bed on lowest position, side rails up, breaks locked.

## 2024-05-23 NOTE — ED PROVIDER NOTES
"ER Provider Note    Scribed for Angel Sellers D.O. by Ana Martel. 5/22/2024  7:16 PM    Primary Care Provider: Chema Lane P.A.-C.    CHIEF COMPLAINT  Chief Complaint   Patient presents with    Chest Pain     Pt BIBA for substernal chest pain starting at 4pm, radiating bilateral arms & neck pain upon deep breathes.   +SOB.        HPI/ROS    Brandon Polanco is a 43 y.o. male who presents to the Emergency Department for chest pain onset 3 hours ago. The patient reports that he was walking to his car when he felt a pain in his chest that he describes being like \"when you pop out a rib.\" He began to experience increasing pain for the next 2 hours that radiated up his neck and down both his arms. He notes that his pain has been mostly resolved since he was given pain medication in the ambulance but he thinks it may be returning. He has associated shortness of breath, cough, and clamminess. He notes that he had strep throat two weeks ago. Denies smoking or cardiac history. He adds that his father had a heart attack at 30 and passed at 36 years old but he thinks that was due to \"heavy drug use.\"     ROS as per HPI.    PAST MEDICAL HISTORY  Past Medical History:   Diagnosis Date    Patient denies medical problems        SURGICAL HISTORY  Past Surgical History:   Procedure Laterality Date    EYE SURGERY      lasik    VASECTOMY         FAMILY HISTORY  Family History   Problem Relation Age of Onset    Cancer Mother         breast- met    Heart Disease Maternal Grandmother     Heart Disease Maternal Grandfather     Heart Disease Paternal Grandmother     Heart Disease Paternal Grandfather     Diabetes Neg Hx     Stroke Neg Hx        SOCIAL HISTORY   reports that he has never smoked. He has never used smokeless tobacco. He reports current alcohol use of about 4.2 oz of alcohol per week. He reports that he does not use drugs.    CURRENT MEDICATIONS  Current Outpatient Medications   Medication Instructions    " "albuterol 108 (90 Base) MCG/ACT Aero Soln inhalation aerosol 2 Puffs, Inhalation, EVERY 4 HOURS PRN    Fexofenadine-Pseudoephedrine (ALLEGRA-D 12 HOUR PO) 1 Tablet, Oral, EVERY 12 HOURS      ALLERGIES  Patient has no known allergies.    PHYSICAL EXAM  /82   Pulse 91   Temp 36.6 °C (97.9 °F) (Temporal)   Resp 16   Ht 1.803 m (5' 11\")   Wt 114 kg (250 lb 7.1 oz)   SpO2 95%   BMI 34.93 kg/m²     General: No acute distress. BMI> 30.  HENT: Normocephalic, Mucus membranes are moist.   Chest: Lungs have even and unlabored respirations, Clear to auscultation.   Cardiovascular: Regular rate and regular rhythm, No peripheral cyanosis.  Abdomen: Non distended.  Neuro: Awake, Conversive, Able to relay recent events.  Psychiatric: Calm and cooperative.     EXTERNAL RECORDS REVIEWED  Review of patient's past medical records show an outpatient clinic note from 5/10/24 revealing a strep tonsillitis diagnosis.     INITIAL ASSESSMENT    This patient had chest pain that radiated to his neck and both arms. He experienced shortness of breath. Cardiac risk factors include BMI> 30. Father with MI in his 30's. Initial EKG showed no STEMI. He is pain free now. Will evaluate with troponin and chest xray.     ED Observation Status? No; Patient does not meet criteria for ED Observation.     DIAGNOSTIC STUDIES    Labs:   Results for orders placed or performed during the hospital encounter of 05/22/24   CBC with Differential   Result Value Ref Range    WBC 25.4 (H) 4.8 - 10.8 K/uL    RBC 5.36 4.70 - 6.10 M/uL    Hemoglobin 16.0 14.0 - 18.0 g/dL    Hematocrit 48.6 42.0 - 52.0 %    MCV 90.7 81.4 - 97.8 fL    MCH 29.9 27.0 - 33.0 pg    MCHC 32.9 32.3 - 36.5 g/dL    RDW 39.5 35.9 - 50.0 fL    Platelet Count 227 164 - 446 K/uL    MPV 11.5 9.0 - 12.9 fL    Neutrophils-Polys 83.60 (H) 44.00 - 72.00 %    Lymphocytes 8.00 (L) 22.00 - 41.00 %    Monocytes 7.10 0.00 - 13.40 %    Eosinophils 0.30 0.00 - 6.90 %    Basophils 0.30 0.00 - 1.80 %    " Immature Granulocytes 0.70 0.00 - 0.90 %    Nucleated RBC 0.00 0.00 - 0.20 /100 WBC    Neutrophils (Absolute) 21.26 (H) 1.82 - 7.42 K/uL    Lymphs (Absolute) 2.04 1.00 - 4.80 K/uL    Monos (Absolute) 1.81 (H) 0.00 - 0.85 K/uL    Eos (Absolute) 0.07 0.00 - 0.51 K/uL    Baso (Absolute) 0.08 0.00 - 0.12 K/uL    Immature Granulocytes (abs) 0.18 (H) 0.00 - 0.11 K/uL    NRBC (Absolute) 0.00 K/uL   Complete Metabolic Panel (CMP)   Result Value Ref Range    Sodium 138 135 - 145 mmol/L    Potassium 4.5 3.6 - 5.5 mmol/L    Chloride 103 96 - 112 mmol/L    Co2 22 20 - 33 mmol/L    Anion Gap 13.0 7.0 - 16.0    Glucose 104 (H) 65 - 99 mg/dL    Bun 18 8 - 22 mg/dL    Creatinine 1.02 0.50 - 1.40 mg/dL    Calcium 9.1 8.5 - 10.5 mg/dL    Correct Calcium 9.0 8.5 - 10.5 mg/dL    AST(SGOT) 22 12 - 45 U/L    ALT(SGPT) 30 2 - 50 U/L    Alkaline Phosphatase 81 30 - 99 U/L    Total Bilirubin 0.4 0.1 - 1.5 mg/dL    Albumin 4.1 3.2 - 4.9 g/dL    Total Protein 7.3 6.0 - 8.2 g/dL    Globulin 3.2 1.9 - 3.5 g/dL    A-G Ratio 1.3 g/dL   Troponins NOW   Result Value Ref Range    Troponin T <6 6 - 19 ng/L   ESTIMATED GFR   Result Value Ref Range    GFR (CKD-EPI) 94 >60 mL/min/1.73 m 2   EKG   Result Value Ref Range    Report       [facility]    Test Date:  2024  Pt Name:    JAY KIRK                   Department: ER  MRN:        [id number]                      Room:  Gender:     Male                         Technician: [id number]  :        1980                   Requested By:ER TRIAGE PROTOCOL  Order #:    [id number]                    [place] MD: SURY R MUSSEHL, D.O.    Measurements  Intervals                                Axis  Rate:       94                           P:          41  NY:         153                          QRS:        9  QRSD:       80                           T:          67  QT:         351  QTc:        439    Interpretive Statements  Sinus rhythm    No previous ECG available for  comparison  Electronically Signed On 05- 19:51:23 PDT by SURY CLOUD D.O.        EKG:   I have independently interpreted the above EKG.    Radiology:   The attending emergency physician has independently interpreted the diagnostic imaging associated with this visit and am waiting the final reading from the radiologist.   Preliminary interpretation is as follows: No acute disease  Radiologist interpretation:   DX-CHEST-PORTABLE (1 VIEW)   Final Result      No acute cardiac or pulmonary abnormalities are identified.         COURSE & MEDICAL DECISION MAKING     COURSE AND PLAN  7:16 PM - Patient seen and examined at bedside. Discussed plan of care, including labs and imaging. Patient agrees to the plan of care. Ordered for DX-Chest, CBC with diff, CMP, Troponin, and EKG to evaluate his symptoms.     8:23 PM - Patient reevaluated at bedside. He feels improved. I discussed the patient's diagnostic study results which show no evidence of MI. Discussed plan for discharge, including plan for follow-up, and informed them to return to the [facility] ED with any new or worsening symptoms. Patient was given the opportunity for questions, and I addressed all questions or concerns. He is stable for discharge at this time. Patient verbalizes understanding and support with my plan for discharge.     ED Summary: Patient presented with an episode of chest pain, radiated up to the neck and both arms.  He did have some shortness of breath with this.  His heart score is 3.  Troponin is normal, EKG shows no ischemic changes.  With this he is stable for discharge home.  He did receive pain medications prior to arrival which did resolve all of his pain.  He is to return if his pain returns, or if he has any change or worsening symptoms    A referral has been placed for cardiology for rapid cardiac follow-up.      DISPOSITION AND DISCUSSIONS  I have discussed management of the patient with the following physicians and EM's:  None    Discussion of management with other [facility] or appropriate source(s): None    Barriers to care at this time, including but not limited to: None     The patient will return for new or worsening symptoms and is stable at the time of discharge.    The patient is referred to a primary physician for blood pressure management, diabetic screening, and for all other preventative health concerns.    DISPOSITION:  Patient will be discharged home in stable condition.    FOLLOW UP:  [facility]  [address] [facility] [address]  [facility] [id number] [phone]  In 1 day        FINAL DIAGNOSIS  1. Precordial chest pain        Ana MONTE (Scribe), am scribing for, and in the presence of, Angel Sellers D.O..    Electronically signed by: Ana Martel (Tosinibrenetta), 5/22/2024    Angel MONTE D.O. personally performed the services described in this documentation, as scribed by Ana Martel in my presence, and it is both accurate and complete.     The note accurately reflects work and decisions made by me.  Angel Sellers D.O.  5/22/2024  9:05 PM

## 2024-05-23 NOTE — DISCHARGE INSTRUCTIONS
There is no signs of a heart attack or heart injury at this time.  I cannot say for sure what this pain is.  Please take 1 aspirin daily.    A referral has been placed for cardiology evaluation.  They should contact you for follow-up however please call the number provided above to speed up the process to make an appointment for further follow-up.  Return for any change or worsening symptoms.

## 2024-05-23 NOTE — ED NOTES
Vital signs taken and recorded. IV removed. Discharge in stable condition ambulatory accompanied by spouse. Health teachings given to patient and family with full understanding of the information given. No personal belongings left.

## 2024-05-23 NOTE — ED TRIAGE NOTES
.  Chief Complaint   Patient presents with    Chest Pain     Pt BIBA for substernal chest pain starting at 4pm, radiating bilateral arms & neck pain upon deep breathes.   +SOB.      Pt EKG completed prior to triage.   Pt denies chest pin at this time. Aox4, GCS15 and speaking in complete sentences.   Denies cardiac HX.     PTA: 18G LAC, 324 aspirin, 0.4 nitro, 125fent.

## 2024-05-24 ENCOUNTER — HOSPITAL ENCOUNTER (OUTPATIENT)
Dept: RADIOLOGY | Facility: MEDICAL CENTER | Age: 44
End: 2024-05-24
Attending: PHYSICIAN ASSISTANT
Payer: COMMERCIAL

## 2024-05-24 ENCOUNTER — RESEARCH ENCOUNTER (OUTPATIENT)
Dept: RESEARCH | Facility: MEDICAL CENTER | Age: 44
End: 2024-05-24

## 2024-05-24 ENCOUNTER — OFFICE VISIT (OUTPATIENT)
Dept: MEDICAL GROUP | Facility: MEDICAL CENTER | Age: 44
End: 2024-05-24
Payer: OTHER GOVERNMENT

## 2024-05-24 VITALS
WEIGHT: 247 LBS | TEMPERATURE: 96.5 F | SYSTOLIC BLOOD PRESSURE: 104 MMHG | BODY MASS INDEX: 34.58 KG/M2 | OXYGEN SATURATION: 97 % | DIASTOLIC BLOOD PRESSURE: 70 MMHG | HEIGHT: 71 IN | HEART RATE: 83 BPM | RESPIRATION RATE: 16 BRPM

## 2024-05-24 DIAGNOSIS — R07.9 CHEST PAIN, UNSPECIFIED TYPE: ICD-10-CM

## 2024-05-24 DIAGNOSIS — E78.00 PURE HYPERCHOLESTEROLEMIA: ICD-10-CM

## 2024-05-24 DIAGNOSIS — R07.89 ATYPICAL CHEST PAIN: ICD-10-CM

## 2024-05-24 DIAGNOSIS — Z00.6 RESEARCH STUDY PATIENT: ICD-10-CM

## 2024-05-24 DIAGNOSIS — R06.02 SHORTNESS OF BREATH: ICD-10-CM

## 2024-05-24 DIAGNOSIS — R06.81 WITNESSED EPISODE OF APNEA: ICD-10-CM

## 2024-05-24 DIAGNOSIS — Z84.81 FHX: BRCA2 GENE POSITIVE: ICD-10-CM

## 2024-05-24 DIAGNOSIS — D72.829 LEUKOCYTOSIS, UNSPECIFIED TYPE: ICD-10-CM

## 2024-05-24 PROCEDURE — 3078F DIAST BP <80 MM HG: CPT | Performed by: PHYSICIAN ASSISTANT

## 2024-05-24 PROCEDURE — 3074F SYST BP LT 130 MM HG: CPT | Performed by: PHYSICIAN ASSISTANT

## 2024-05-24 PROCEDURE — 99214 OFFICE O/P EST MOD 30 MIN: CPT | Performed by: PHYSICIAN ASSISTANT

## 2024-05-24 ASSESSMENT — FIBROSIS 4 INDEX: FIB4 SCORE: 0.74

## 2024-05-24 NOTE — RESEARCH NOTE
Patient has been referred by Chema Lane P.A.-C. Sent initial referral follow-up message with instructions to locate and sign consent form(s). The following consent form(s) have been pushed to the patient's MyChart: MARGARITO/DAISY

## 2024-05-24 NOTE — PROGRESS NOTES
Subjective:     History of Present Illness  The patient presents for evaluation of multiple medical concerns. He is accompanied by his wife.    The patient was previously evaluated in the Emergency Room for chest pain and bilateral arm pain, which have since resolved. His chest pain subsided last night, however, he experienced an elevated pulse and decreased pulse oximetry readings between 85 and 90 with activity. Upon awakening this morning, he did not experience chest pain, but his pulse remained slightly elevated, albeit with satisfactory pulse oximetry readings. He engaged in physical activity, including walking around the house, and his pulse oximetry reading was 85. He experienced a low-grade fever of 99.5 approximately 1 to 2 weeks ago.    The patient suspects he may have sleep apnea, as his wife has observed intermittent episodes. He has also exhibited episodes of groaning during sleep, followed by a cycle of approximately 10 seconds or so of not breathing, followed by significant inhalation, followed by a medium-sized inhalation, followed by no breathing for 10 more seconds.    The patient and his wife contracted an illness in early 2024, and have since been unable to resume his normal activities. He typically engages in exercise 3 to 4 times a week, but recently, he has been experiencing dyspnea and mild wheezing upon exertion. He has been utilizing a rescue inhaler as needed, which appears to provide relief.    The patient has not previously consulted with an oncologist or hematologist. He was scheduled for a BRCA2 test in 2018, but due to his mother's death in , he has not followed up on this test.   He has a family history of heart disease. His mother  of cancer.      Current medicines (including changes today)  Current Outpatient Medications   Medication Sig Dispense Refill    Fexofenadine-Pseudoephedrine (ALLEGRA-D 12 HOUR PO) Take 1 Tablet by mouth every 12 hours.      albuterol 108 (90  "Base) MCG/ACT Aero Soln inhalation aerosol Inhale 2 Puffs every four hours as needed for Shortness of Breath for up to 30 days. 1 Each 5     No current facility-administered medications for this visit.     He  has a past medical history of Patient denies medical problems.    He has no past medical history of Allergy or ASTHMA.    ROS   No abdominal pain  Positive ROS as per HPI.  All other systems reviewed and are negative.     Objective:     /70 (BP Location: Left arm, Patient Position: Sitting, BP Cuff Size: Adult)   Pulse 83   Temp 35.8 °C (96.5 °F) (Temporal)   Resp 16   Ht 1.803 m (5' 11\")   Wt 112 kg (247 lb)   SpO2 97%  Body mass index is 34.45 kg/m².   Physical Exam    Constitutional: Alert, no distress.  Skin: Warm, dry, good turgor, no rashes in visible areas.  Eye: Equal, round and reactive, conjunctiva clear, lids normal.  ENMT: Lips without lesions, good dentition, oropharynx clear.  Neck: Trachea midline, no masses, no thyromegaly.   Respiratory: Unlabored respiratory effort.  Psych: Alert and oriented x3, normal affect and mood.      Results  Laboratory Studies  White blood cell count was 25.4. Neutrophil count is high. Blood sugar was high.    Imaging  Chest x-ray was normal.        Assessment and Plan:   The following treatment plan was discussed    Assessment & Plan  1. Atypical chest pain.  Upon reviewing the notes, imaging, and labs from the ER, it was determined that the cause of the chest pain remains undetermined. The patient's chest pain has since resolved. Earlier, a referral was made to cardiology. The patient has been advised to contact me via [employer] if there is a recurrence of symptoms, at which point an echocardiogram can be ordered.    2. Witnessed episodes of apnea.  The patient's wife, a chronic condition, has observed episodes of apnea during sleep. A referral has been made to sleep medicine for further evaluation.    3. Shortness of breath.  This condition is acutely " concerning, potentially postviral. A Pulmonary Function Test (PFT) has been ordered, given the patient's persistent shortness of breath during physical activity. The patient is advised to continue using the rescue inhaler prior to physical activity.    4. Hypercholesterolemia.  A follow-up with labs has been scheduled for in the near future. A CT cardiac scoring has been ordered. The patient has been advised to contact the imaging center, [facility].    5. Leukocytosis.  The patient's recent CBC was significantly high at 25. A repeat CBC will be scheduled in 3 weeks. The patient is advised to consume some glasses of water prior to the test.    6. Family history of BRCA2 gene positive with mother.  The patient has been referred to genetic studies for evaluation.      ORDERS:  1. Atypical chest pain    - Referral to Pulmonary and Sleep Medicine    2. Witnessed episode of apnea    - Referral to Pulmonary and Sleep Medicine    3. Shortness of breath    - PULMONARY FUNCTION TESTS -Test requested: Complete Pulmonary Function Test; Future    4. Pure hypercholesterolemia    - CT-CARDIAC SCORING; Future    5. Leukocytosis, unspecified type      6. FHx: BRCA2 gene positive    - Referral to Genetic Research Studies        Please note that this dictation was created using voice recognition software. I have made every reasonable attempt to correct obvious errors, but I expect that there are errors of grammar and possibly content that I did not discover before finalizing the note.      Attestation      Verbal consent was acquired by the patient to use [employer] ambient listening note generation during this visit Yes

## 2024-05-24 NOTE — RESEARCH NOTE
Confirmed with the participant which designated provider (Chema Lane P.A.-C.) they would like study results shared with. Patient will have an opportunity to share the results with any providers of their choosing in the future by accessing their results from [employer].

## 2024-05-28 PROBLEM — R93.1 AGATSTON CORONARY ARTERY CALCIUM SCORE LESS THAN 100: Status: ACTIVE | Noted: 2024-05-28

## 2024-05-30 ENCOUNTER — HOSPITAL ENCOUNTER (OUTPATIENT)
Dept: LAB | Facility: MEDICAL CENTER | Age: 44
End: 2024-05-30
Attending: PHYSICIAN ASSISTANT

## 2024-05-30 DIAGNOSIS — Z00.6 RESEARCH STUDY PATIENT: ICD-10-CM

## 2024-06-04 ENCOUNTER — TELEPHONE (OUTPATIENT)
Dept: HEALTH INFORMATION MANAGEMENT | Facility: OTHER | Age: 44
End: 2024-06-04
Payer: OTHER GOVERNMENT

## 2024-06-06 ENCOUNTER — OFFICE VISIT (OUTPATIENT)
Dept: SLEEP MEDICINE | Facility: MEDICAL CENTER | Age: 44
End: 2024-06-06
Attending: PHYSICIAN ASSISTANT
Payer: OTHER GOVERNMENT

## 2024-06-06 VITALS
BODY MASS INDEX: 34.38 KG/M2 | DIASTOLIC BLOOD PRESSURE: 72 MMHG | SYSTOLIC BLOOD PRESSURE: 110 MMHG | HEART RATE: 74 BPM | RESPIRATION RATE: 16 BRPM | OXYGEN SATURATION: 94 % | HEIGHT: 71 IN | WEIGHT: 245.6 LBS

## 2024-06-06 DIAGNOSIS — G47.19 EXCESSIVE DAYTIME SLEEPINESS: ICD-10-CM

## 2024-06-06 DIAGNOSIS — R07.89 ATYPICAL CHEST PAIN: ICD-10-CM

## 2024-06-06 DIAGNOSIS — G47.30 SLEEP DISORDER BREATHING: Primary | ICD-10-CM

## 2024-06-06 DIAGNOSIS — R06.81 WITNESSED EPISODE OF APNEA: ICD-10-CM

## 2024-06-06 LAB
ELF SCORE: 10.32 - (ref 9.8–11.3)
HA (HYALURONIC ACID): 168.61 NG/ML
PIIINP (AMINO-TERMINAL PROPEPTIDE): 7.38 NG/ML
RELATIVE RISK: ABNORMAL
RISK GROUP: ABNORMAL
RISK: 23.6 %
TIMP-1 (TISSUE INHIBITOR OF MMP1): 254.2 NG/ML

## 2024-06-06 PROCEDURE — 99204 OFFICE O/P NEW MOD 45 MIN: CPT | Performed by: STUDENT IN AN ORGANIZED HEALTH CARE EDUCATION/TRAINING PROGRAM

## 2024-06-06 PROCEDURE — 3074F SYST BP LT 130 MM HG: CPT | Performed by: STUDENT IN AN ORGANIZED HEALTH CARE EDUCATION/TRAINING PROGRAM

## 2024-06-06 PROCEDURE — 99211 OFF/OP EST MAY X REQ PHY/QHP: CPT | Performed by: STUDENT IN AN ORGANIZED HEALTH CARE EDUCATION/TRAINING PROGRAM

## 2024-06-06 PROCEDURE — 3078F DIAST BP <80 MM HG: CPT | Performed by: STUDENT IN AN ORGANIZED HEALTH CARE EDUCATION/TRAINING PROGRAM

## 2024-06-06 ASSESSMENT — FIBROSIS 4 INDEX: FIB4 SCORE: 0.74

## 2024-06-06 NOTE — PROGRESS NOTES
[facility] Consult Note     Date: 6/6/2024 / Time: 10:41 AM      Thank you for requesting a sleep medicine consultation on Brandon Polanco at the sleep center. Presents today with the chief complaints of snoring, witnessed apneas and occasional excessive daytime sleepiness. He is referred by Chema Lane P.A.-C.  [address]  [address]  [place] [address] for evaluation and treatment of sleep disorder breathing .     HISTORY OF PRESENT ILLNESS:     Brandon Polanco is a 43 y.o. male with obesity and seasonal allergies.  Presents to Sleep Clinic for evaluation of sleep.    He reports about a week ago he went to the ER with atypical chest pain.  He was evaluated with cardiac workup which was negative.  He discussed his ER visit with his PCP and there is discussion about potential sleep apnea.  This is what led to today's visit.    His wife has told him that he snores in his sleep.  In addition to the snoring he will have pauses in breathing.  He states this is been happening for some time however recently in the past 2 to 3 months he has developed daytime tiredness.  He is finding during the day he has tiredness at times.  He can have low energy as well.  This is led to him almost napping daily.  Prior to this he did not regularly nap    As per supplemental questionnaire to be scanned or imported into chart:    [place] Sleepiness Score: 13    Sleep Schedule  Bedtime: Weekday 9 pm  Weekend 9 pm  Wake time: Weekday 545am Weekend 7-8am  Sleep-onset latency: 30 min  Awakenings from sleep: 2  Difficulty falling back asleep: No  Bedroom partner: yes  Naps: Yes last couple of months almost daily.     DAYTIME SYMPTOMS:   Excessive daytime sleepiness: Yes  Daytime fatigue: Yes  Difficulty concentrating: No   Memory problems: No   Irritability:Yes sometimes   Work/school performance issues: No   Sleepiness with driving: No  Caffeine/stimulant use: Yes  Alcohol use:Yes, How Many? 1 drinks      SLEEP  "RELATED SYMPTOMS  Snoring: Yes  Witnessed apnea or gasping/choking: Yes  Dry mouth or mouth breathing: Yes  Sweating: No   Teeth grinding/biting: No   Morning headaches: No   Refreshed Upon Awakening: Yes     SLEEP RELATED BEHAVIORS:  Parasomnias (walking, talking, eating, violence): No   Leg kicking: No   Restless legs - \"urge to move\": No   Nightmares: No  Recurrent: No   Dream enactment: No      NARCOLEPSY:  Cataplexy: No   Sleep paralysis: No   Sleep attacks: No   Hypnagogic/hypnopompic hallucinations: No     MEDICAL HISTORY  Past Medical History:   Diagnosis Date    Apnea, sleep     Daytime sleepiness     Patient denies medical problems     Snoring         SURGICAL HISTORY  Past Surgical History:   Procedure Laterality Date    EYE SURGERY      lasik    VASECTOMY          FAMILY HISTORY  Family History   Problem Relation Age of Onset    Cancer Mother         breast- met    Heart Disease Maternal Grandmother     Heart Disease Maternal Grandfather     Heart Disease Paternal Grandmother     Heart Disease Paternal Grandfather     Diabetes Neg Hx     Stroke Neg Hx        SOCIAL HISTORY  Social History     Socioeconomic History    Marital status:    Tobacco Use    Smoking status: Never    Smokeless tobacco: Never   Vaping Use    Vaping status: Never Used   Substance and Sexual Activity    Alcohol use: Yes     Alcohol/week: 4.2 oz     Types: 7 Shots of liquor per week     Comment: 1drink/night    Drug use: No    Sexual activity: Yes     Partners: Female     Birth control/protection: Surgical     Comment: , 2 daughters, [employer]        Occupation: [employer]     CURRENT MEDICATIONS  Current Outpatient Medications   Medication Sig Dispense Refill    Fexofenadine-Pseudoephedrine (ALLEGRA-D 12 HOUR PO) Take 1 Tablet by mouth every 12 hours.       No current facility-administered medications for this visit.       REVIEW OF SYSTEMS  Constitutional: Denies fevers, Denies weight changes  Ears/Nose/Throat/Mouth: Denies " "nasal congestion or sore throat   Cardiovascular: Denies chest pain  Respiratory: Denies shortness of breath, Denies cough  Gastrointestinal/Hepatic: Denies nausea, vomiting  Sleep: see HPI    Physical Examination:  Vitals/ General Appearance:   Weight/BMI: Body mass index is 34.25 kg/m².  /72 (BP Location: Left arm, Patient Position: Sitting, BP Cuff Size: Adult)   Pulse 74   Resp 16   Ht 1.803 m (5' 11\")   Wt 111 kg (245 lb 9.6 oz)   SpO2 94%   Vitals:    06/06/24 1013   BP: 110/72   BP Location: Left arm   Patient Position: Sitting   BP Cuff Size: Adult   Pulse: 74   Resp: 16   SpO2: 94%   Weight: 111 kg (245 lb 9.6 oz)   Height: 1.803 m (5' 11\")       Pt. is alert and oriented to time, place and person. Cooperative and in no apparent distress.     Constitutional: Alert, no distress, well-groomed.  Skin: No rashes in visible areas.  Eye: Round. Conjunctiva clear, lids normal. No icterus.   ENT EXAM  Nasal alae/valves collapsible: No   Nasal septum deviation: No   Nasal turbinate hypertrophy: Left: Grade 1   Right: Grade 1  Hard palate narrow: No   Hard palate high: No   Soft palate/uvula (Mallampati score): 3  Tongue Scalloping: Yes  Retrognathia: No   Micrognathia: No   Cardiovascular:no murmus/gallops/rubs, normal S1 and S2 heart sounds, regular rate and rhythm  Pulmonary:Clear to auscultation, No wheezes, No crackles.  Neurologic:Awake, alert and oriented x 3, Normal age appropriate gait, No involuntary motions.  Extremities: No clubbing, cyanosis, or edema       ASSESSMENT AND PLAN   Brandon Polanco is a 43 y.o. male with obesity and seasonal allergies.  Presents to Sleep Clinic for evaluation of sleep.    1. Brandon Polanco  has symptoms of Obstructive Sleep Apnea (ABEBE). Brandon Polanco has symptoms of snoring, witnessed apnea, daytime sleepiness. These can interfere with activities of daily living.   ESS 13  Pt has risk factors for ABEBE include obesity and crowded oropharynx.     The " pathophysiology of ABEBE and the increased risk of cardiovascular morbidity from untreated ABEBE is discussed in detail with the patient.       We have discussed diagnostic options including in-laboratory, attended polysomnography and home sleep testing. We have also discussed treatment options including airway pressurization, reconstructive otolaryngologic surgery, dental appliances and weight management.     Subsequently,treatment options will be discussed based on the diagnostic study. Meanwhile, He is urged to avoid supine sleep, weight gain and alcoholic beverages since all of these can worsen ABEBE. He is cautioned against drowsy driving. If He feels sleepy while driving, advised must pull over for a break/nap, rather than persist on the road, in the interest of Pt's own safety and that of others on the road.    Plan  -  He  will be scheduled for an overnight PSG  to assess sleep related breathing disorder.  - Follow up 1-2 weeks after sleep study to discuss results and treatment options moving forward   -Advised to reach out via MyChart or by phone with any questions or concerns.     2.  Atypical chest pain  Reviewed with patient that chest pain is not often associated with sleep apnea but may be a contributor.  Discussed that untreated sleep apnea can impact cardiovascular health.  Given his concerns around his breathing and sleep untreated sleep apnea may be present.  Advised for him to continue to monitor for recurrence of chest pain.  If he does experience chest pain or shortness of breath he should present to the ER.      Please note portions of this record was created using voice recognition software. I have made every reasonable attempt to correct obvious errors, but I expect that there are errors of grammar and possibly content I did not discover before finalizing the note.

## 2024-06-10 ENCOUNTER — NON-PROVIDER VISIT (OUTPATIENT)
Dept: SLEEP MEDICINE | Facility: MEDICAL CENTER | Age: 44
End: 2024-06-10
Attending: PHYSICIAN ASSISTANT
Payer: OTHER GOVERNMENT

## 2024-06-10 VITALS — BODY MASS INDEX: 34.16 KG/M2 | WEIGHT: 244 LBS | HEIGHT: 71 IN

## 2024-06-10 DIAGNOSIS — R06.02 SHORTNESS OF BREATH: ICD-10-CM

## 2024-06-10 PROCEDURE — 94060 EVALUATION OF WHEEZING: CPT | Performed by: PHYSICIAN ASSISTANT

## 2024-06-10 PROCEDURE — 94729 DIFFUSING CAPACITY: CPT | Mod: 26 | Performed by: INTERNAL MEDICINE

## 2024-06-10 PROCEDURE — 94726 PLETHYSMOGRAPHY LUNG VOLUMES: CPT | Mod: 26 | Performed by: INTERNAL MEDICINE

## 2024-06-10 PROCEDURE — 94060 EVALUATION OF WHEEZING: CPT | Mod: 26 | Performed by: INTERNAL MEDICINE

## 2024-06-10 PROCEDURE — 94726 PLETHYSMOGRAPHY LUNG VOLUMES: CPT | Performed by: PHYSICIAN ASSISTANT

## 2024-06-10 PROCEDURE — 94729 DIFFUSING CAPACITY: CPT | Performed by: PHYSICIAN ASSISTANT

## 2024-06-10 ASSESSMENT — PULMONARY FUNCTION TESTS
FEV1/FVC_PERCENT_PREDICTED: 108
FEV1/FVC: 86.73
FEV1/FVC_PERCENT_PREDICTED: 101
FEV1_PREDICTED: 4.27
FEV1_PERCENT_CHANGE: -1
FEV1/FVC_PERCENT_PREDICTED: 102
FEV1_PERCENT_CHANGE: 4
FEV1/FVC_PERCENT_PREDICTED: 80
FEV1/FVC: 81
FVC_PERCENT_PREDICTED: 94
FEV1/FVC_PERCENT_CHANGE: 6
FVC: 5.05
FVC_LLN: 4.48
FEV1/FVC: 81
FEV1_PERCENT_PREDICTED: 102
FVC: 5.15
FVC_PERCENT_PREDICTED: 95
FEV1_PERCENT_PREDICTED: 97
FEV1/FVC_PERCENT_LLN: 67
FEV1/FVC_PERCENT_PREDICTED: 109
FEV1: 4.19
FEV1/FVC: 87
FEV1/FVC_PERCENT_CHANGE: -400
FVC_PREDICTED: 5.37
FEV1_LLN: 3.57
FEV1/FVC_PREDICTED: 80
FEV1: 4.38

## 2024-06-10 ASSESSMENT — FIBROSIS 4 INDEX: FIB4 SCORE: 0.74

## 2024-06-10 NOTE — PROCEDURES
Technician: Sondra Ricketts RRT, CPFT  Good patient effort & cooperation.  The results of this test meet the ATS/ERS standards for acceptability & reproducibility.  Test was performed on the Etherpad Body Plethysmograph-Elite DX system.  Predicted equations for Spirometry are GLI-2012, ITS for lung volumes, and GLI-2017 for DLCO.  The DLCO was uncorrected for Hgb.  A bronchodilator of Ventolin HFA -2puffs via spacer administered.  DLCO performed during dilation period.    Interpretation;   Baseline spirometry shows normal airflows.  No significant bronchodilator response.  Lung volumes are normal.  Diffusion capacity is normal.  Normal pulmonary function testing with normal flow volume loop.  This does not rule out reactive airways disease.  Correlate clinically.

## 2024-06-11 ENCOUNTER — OFFICE VISIT (OUTPATIENT)
Dept: CARDIOLOGY | Facility: MEDICAL CENTER | Age: 44
End: 2024-06-11
Attending: PHYSICIAN ASSISTANT
Payer: OTHER GOVERNMENT

## 2024-06-11 VITALS
WEIGHT: 250 LBS | SYSTOLIC BLOOD PRESSURE: 114 MMHG | BODY MASS INDEX: 35 KG/M2 | RESPIRATION RATE: 18 BRPM | DIASTOLIC BLOOD PRESSURE: 70 MMHG | HEART RATE: 96 BPM | HEIGHT: 71 IN | OXYGEN SATURATION: 92 %

## 2024-06-11 DIAGNOSIS — R93.1 AGATSTON CORONARY ARTERY CALCIUM SCORE LESS THAN 100: ICD-10-CM

## 2024-06-11 DIAGNOSIS — R73.03 PREDIABETES: ICD-10-CM

## 2024-06-11 DIAGNOSIS — E66.9 OBESITY (BMI 30-39.9): ICD-10-CM

## 2024-06-11 DIAGNOSIS — R07.89 ATYPICAL CHEST PAIN: ICD-10-CM

## 2024-06-11 PROCEDURE — 99204 OFFICE O/P NEW MOD 45 MIN: CPT | Performed by: NURSE PRACTITIONER

## 2024-06-11 PROCEDURE — 3078F DIAST BP <80 MM HG: CPT | Performed by: NURSE PRACTITIONER

## 2024-06-11 PROCEDURE — 99211 OFF/OP EST MAY X REQ PHY/QHP: CPT | Performed by: NURSE PRACTITIONER

## 2024-06-11 PROCEDURE — 3074F SYST BP LT 130 MM HG: CPT | Performed by: NURSE PRACTITIONER

## 2024-06-11 ASSESSMENT — ENCOUNTER SYMPTOMS
DIZZINESS: 0
ORTHOPNEA: 0
SHORTNESS OF BREATH: 0
PALPITATIONS: 0
CLAUDICATION: 0
ABDOMINAL PAIN: 0
MYALGIAS: 0
COUGH: 0
PND: 0
FEVER: 0

## 2024-06-11 ASSESSMENT — FIBROSIS 4 INDEX: FIB4 SCORE: 0.74

## 2024-06-11 NOTE — PROGRESS NOTES
Chief Complaint   Patient presents with    Chest Pain     Hx of chest pain     Subjective     Etrik Brendan Polanco is a 43 y.o. male who presents today for ER chest pain follow up.    He is a patient of Chema Lane PA-C. Hx of obesity with pre-diabetes and probable ABEBE, and more recently strep throat with acute respiratory illness and atypical chest pain.    He presented to the ER for acute mid sternal chest pain with radiation to both arms. It resolved with narcotics in the ambulance. He hasn't had any recurrence.    He will follow up with his PCP on this episode of chest pain. Sounds more MSK related.    He does have family history of cardiac disease with his father, but he also had drug abuse and  of cocaine overdose at a young age.     He has no exertional symptoms although has been feeling under the weather with respiratory infection recently, he will get back to his normal exercise routine now.    He has no shortness of breath, edema, dizziness/lightheadedness, or palpitations.    Past Medical History:   Diagnosis Date    Apnea, sleep     Daytime sleepiness     Patient denies medical problems     Snoring      Past Surgical History:   Procedure Laterality Date    EYE SURGERY      lasik    VASECTOMY       Family History   Problem Relation Age of Onset    Cancer Mother         breast- met    Heart Disease Maternal Grandmother     Heart Disease Maternal Grandfather     Heart Disease Paternal Grandmother     Heart Disease Paternal Grandfather     Diabetes Neg Hx     Stroke Neg Hx      Social History     Socioeconomic History    Marital status:      Spouse name: Not on file    Number of children: Not on file    Years of education: Not on file    Highest education level: Not on file   Occupational History    Not on file   Tobacco Use    Smoking status: Never    Smokeless tobacco: Never   Vaping Use    Vaping status: Never Used   Substance and Sexual Activity    Alcohol use: Yes     Alcohol/week: 4.2 oz      "Types: 7 Shots of liquor per week     Comment: 1drink/night    Drug use: No    Sexual activity: Yes     Partners: Female     Birth control/protection: Surgical     Comment: , 2 daughters, [employer]   Other Topics Concern    Not on file   Social History Narrative    Not on file     Social Determinants of Health     Financial Resource Strain: Not on file   Food Insecurity: Not on file   Transportation Needs: Not on file   Physical Activity: Not on file   Stress: Not on file   Social Connections: Not on file   Intimate Partner Violence: Not on file   Housing Stability: Not on file     No Known Allergies  Outpatient Encounter Medications as of 6/11/2024   Medication Sig Dispense Refill    Fexofenadine-Pseudoephedrine (ALLEGRA-D 12 HOUR PO) Take 1 Tablet by mouth every 12 hours.       No facility-administered encounter medications on file as of 6/11/2024.     Review of Systems   Constitutional:  Negative for fever and malaise/fatigue.   Respiratory:  Negative for cough and shortness of breath.    Cardiovascular:  Positive for chest pain. Negative for palpitations, orthopnea, claudication, leg swelling and PND.   Gastrointestinal:  Negative for abdominal pain.   Musculoskeletal:  Negative for myalgias.   Neurological:  Negative for dizziness.              Objective     /70 (BP Location: Left arm, Patient Position: Sitting, BP Cuff Size: Adult)   Pulse 96   Resp 18   Ht 1.803 m (5' 11\")   Wt 113 kg (250 lb)   SpO2 92%   BMI 34.87 kg/m²     Physical Exam  Vitals and nursing note reviewed.   Constitutional:       Appearance: Normal appearance. He is well-developed. He is obese.   HENT:      Head: Normocephalic and atraumatic.   Neck:      Vascular: No JVD.   Cardiovascular:      Rate and Rhythm: Normal rate and regular rhythm.      Pulses: Normal pulses.      Heart sounds: Normal heart sounds.   Pulmonary:      Effort: Pulmonary effort is normal.      Breath sounds: Normal breath sounds.   Musculoskeletal:    "      General: Normal range of motion.   Skin:     General: Skin is warm and dry.      Capillary Refill: Capillary refill takes less than 2 seconds.   Neurological:      General: No focal deficit present.      Mental Status: He is alert and oriented to person, place, and time. Mental status is at baseline.   Psychiatric:         Mood and Affect: Mood normal.         Behavior: Behavior normal.         Thought Content: Thought content normal.         Judgment: Judgment normal.                Assessment & Plan     1. Atypical chest pain        2. Obesity (BMI 30-39.9)        3. Prediabetes        4. Agatston coronary artery calcium score less than 100          Medical Decision Making: Today's Assessment/Status/Plan:       Atypical chest pain  -no recurrence  -recommend follow symptoms and let us know of occurrence  -work on weight loss and OK to go back to exercise  -recent CT scoring score of zero  -consider treadmill stress if symptoms recur  -pre-diabetes noted, follow up with PCP    Patient is to follow up with our office PRN for any further symptoms of concern.

## 2024-06-12 LAB
APOB+LDLR+PCSK9 GENE MUT ANL BLD/T: NOT DETECTED
BRCA1+BRCA2 DEL+DUP + FULL MUT ANL BLD/T: ABNORMAL
GENETIC ANALYSIS OVERALL INTERPRETATION: POSITIVE
GENOMIC SOURCE CLASS: ABNORMAL
MLH1+MSH2+MSH6+PMS2 GN DEL+DUP+FUL M: NOT DETECTED

## 2024-06-13 ENCOUNTER — HOSPITAL ENCOUNTER (OUTPATIENT)
Dept: LAB | Facility: MEDICAL CENTER | Age: 44
End: 2024-06-13
Attending: PHYSICIAN ASSISTANT
Payer: OTHER GOVERNMENT

## 2024-06-13 DIAGNOSIS — Z00.00 PREVENTATIVE HEALTH CARE: ICD-10-CM

## 2024-06-13 LAB
ALBUMIN SERPL BCP-MCNC: 4.2 G/DL (ref 3.2–4.9)
ALBUMIN/GLOB SERPL: 1.4 G/DL
ALP SERPL-CCNC: 85 U/L (ref 30–99)
ALT SERPL-CCNC: 33 U/L (ref 2–50)
ANION GAP SERPL CALC-SCNC: 9 MMOL/L (ref 7–16)
AST SERPL-CCNC: 19 U/L (ref 12–45)
BASOPHILS # BLD AUTO: 0.8 % (ref 0–1.8)
BASOPHILS # BLD: 0.07 K/UL (ref 0–0.12)
BILIRUB SERPL-MCNC: 0.4 MG/DL (ref 0.1–1.5)
BUN SERPL-MCNC: 16 MG/DL (ref 8–22)
CALCIUM ALBUM COR SERPL-MCNC: 9.2 MG/DL (ref 8.5–10.5)
CALCIUM SERPL-MCNC: 9.4 MG/DL (ref 8.4–10.2)
CHLORIDE SERPL-SCNC: 103 MMOL/L (ref 96–112)
CHOLEST SERPL-MCNC: 205 MG/DL (ref 100–199)
CO2 SERPL-SCNC: 25 MMOL/L (ref 20–33)
CREAT SERPL-MCNC: 1.14 MG/DL (ref 0.5–1.4)
EOSINOPHIL # BLD AUTO: 0.19 K/UL (ref 0–0.51)
EOSINOPHIL NFR BLD: 2.1 % (ref 0–6.9)
ERYTHROCYTE [DISTWIDTH] IN BLOOD BY AUTOMATED COUNT: 39.8 FL (ref 35.9–50)
EST. AVERAGE GLUCOSE BLD GHB EST-MCNC: 128 MG/DL
FASTING STATUS PATIENT QL REPORTED: NORMAL
GFR SERPLBLD CREATININE-BSD FMLA CKD-EPI: 82 ML/MIN/1.73 M 2
GLOBULIN SER CALC-MCNC: 3.1 G/DL (ref 1.9–3.5)
GLUCOSE SERPL-MCNC: 111 MG/DL (ref 65–99)
HBA1C MFR BLD: 6.1 % (ref 4–5.6)
HCT VFR BLD AUTO: 49 % (ref 42–52)
HDLC SERPL-MCNC: 37 MG/DL
HGB BLD-MCNC: 16.4 G/DL (ref 14–18)
IMM GRANULOCYTES # BLD AUTO: 0.02 K/UL (ref 0–0.11)
IMM GRANULOCYTES NFR BLD AUTO: 0.2 % (ref 0–0.9)
LDLC SERPL CALC-MCNC: 108 MG/DL
LYMPHOCYTES # BLD AUTO: 2.39 K/UL (ref 1–4.8)
LYMPHOCYTES NFR BLD: 26.1 % (ref 22–41)
MCH RBC QN AUTO: 30.3 PG (ref 27–33)
MCHC RBC AUTO-ENTMCNC: 33.5 G/DL (ref 32.3–36.5)
MCV RBC AUTO: 90.6 FL (ref 81.4–97.8)
MONOCYTES # BLD AUTO: 0.88 K/UL (ref 0–0.85)
MONOCYTES NFR BLD AUTO: 9.6 % (ref 0–13.4)
NEUTROPHILS # BLD AUTO: 5.61 K/UL (ref 1.82–7.42)
NEUTROPHILS NFR BLD: 61.2 % (ref 44–72)
NRBC # BLD AUTO: 0 K/UL
NRBC BLD-RTO: 0 /100 WBC (ref 0–0.2)
PLATELET # BLD AUTO: 168 K/UL (ref 164–446)
PMV BLD AUTO: 11.9 FL (ref 9–12.9)
POTASSIUM SERPL-SCNC: 5 MMOL/L (ref 3.6–5.5)
PROT SERPL-MCNC: 7.3 G/DL (ref 6–8.2)
RBC # BLD AUTO: 5.41 M/UL (ref 4.7–6.1)
SODIUM SERPL-SCNC: 137 MMOL/L (ref 135–145)
TRIGL SERPL-MCNC: 299 MG/DL (ref 0–149)
TSH SERPL DL<=0.005 MIU/L-ACNC: 3.19 UIU/ML (ref 0.38–5.33)
WBC # BLD AUTO: 9.2 K/UL (ref 4.8–10.8)

## 2024-06-13 PROCEDURE — 83036 HEMOGLOBIN GLYCOSYLATED A1C: CPT

## 2024-06-13 PROCEDURE — 84443 ASSAY THYROID STIM HORMONE: CPT

## 2024-06-13 PROCEDURE — 80061 LIPID PANEL: CPT

## 2024-06-13 PROCEDURE — 80053 COMPREHEN METABOLIC PANEL: CPT

## 2024-06-13 PROCEDURE — 36415 COLL VENOUS BLD VENIPUNCTURE: CPT

## 2024-06-13 PROCEDURE — 85025 COMPLETE CBC W/AUTO DIFF WBC: CPT

## 2024-06-20 ENCOUNTER — TELEPHONE (OUTPATIENT)
Dept: RESEARCH | Facility: WORKSITE | Age: 44
End: 2024-06-20
Payer: OTHER GOVERNMENT

## 2024-06-20 NOTE — TELEPHONE ENCOUNTER
Patient informed of genetic test results on 6/20/2024. Patient does not want genetic counseling at this time.

## 2024-06-25 ENCOUNTER — OFFICE VISIT (OUTPATIENT)
Dept: MEDICAL GROUP | Facility: MEDICAL CENTER | Age: 44
End: 2024-06-25
Payer: OTHER GOVERNMENT

## 2024-06-25 VITALS
HEIGHT: 71 IN | TEMPERATURE: 97.6 F | WEIGHT: 247.2 LBS | SYSTOLIC BLOOD PRESSURE: 120 MMHG | DIASTOLIC BLOOD PRESSURE: 70 MMHG | RESPIRATION RATE: 16 BRPM | HEART RATE: 77 BPM | BODY MASS INDEX: 34.61 KG/M2

## 2024-06-25 DIAGNOSIS — R21 RASH: ICD-10-CM

## 2024-06-25 DIAGNOSIS — R73.03 PREDIABETES: ICD-10-CM

## 2024-06-25 PROBLEM — R05.1 ACUTE COUGH: Status: RESOLVED | Noted: 2024-04-26 | Resolved: 2024-06-25

## 2024-06-25 PROCEDURE — 3074F SYST BP LT 130 MM HG: CPT | Performed by: PHYSICIAN ASSISTANT

## 2024-06-25 PROCEDURE — 3078F DIAST BP <80 MM HG: CPT | Performed by: PHYSICIAN ASSISTANT

## 2024-06-25 PROCEDURE — 99214 OFFICE O/P EST MOD 30 MIN: CPT | Performed by: PHYSICIAN ASSISTANT

## 2024-06-25 RX ORDER — PREDNISONE 20 MG/1
TABLET ORAL
Qty: 18 TABLET | Refills: 0 | Status: SHIPPED | OUTPATIENT
Start: 2024-06-25 | End: 2024-07-04

## 2024-06-25 RX ORDER — TRIAMCINOLONE ACETONIDE 1 MG/G
1 CREAM TOPICAL 3 TIMES DAILY
Qty: 80 G | Refills: 5 | Status: SHIPPED | OUTPATIENT
Start: 2024-06-25 | End: 2024-07-09

## 2024-06-25 ASSESSMENT — FIBROSIS 4 INDEX: FIB4 SCORE: 0.83

## 2024-06-25 NOTE — PROGRESS NOTES
"Subjective:     History of Present Illness  The patient presents for evaluation of a rash.    The patient has been experiencing a widespread rash for the past 3 weeks. Initially, the rash was confined to his arm, which he initially attributed to a heat rash. However, the rash has since spread to his sides and down his leg. The rash is associated with mild pruritus, albeit without associated pain. The rash is described as dry. The patient has recently initiated a regimen of 3000 units of vitamin D, a supplement he has previously tolerated well. His wife has recently changed his laundry soap. He denies any exposure to new animals. He also denies any recent infections. His cough has since resolved. He continues his regimen of Allegra-D. Attempts to alleviate the rash with Gold Bond moisturizer have been unsuccessful.    The patient has made dietary modifications.      Current medicines (including changes today)  Current Outpatient Medications   Medication Sig Dispense Refill    predniSONE (DELTASONE) 20 MG Tab Take 3 Tablets by mouth every day for 3 days, THEN 2 Tablets every day for 3 days, THEN 1 Tablet every day for 3 days. 18 Tablet 0    triamcinolone acetonide (KENALOG) 0.1 % Cream Apply 1 Application topically 3 times a day for 14 days. 80 g 5    Fexofenadine-Pseudoephedrine (ALLEGRA-D 12 HOUR PO) Take 1 Tablet by mouth every 12 hours.       No current facility-administered medications for this visit.     He  has a past medical history of Apnea, sleep, Daytime sleepiness, Patient denies medical problems, and Snoring.    He has no past medical history of Allergy or ASTHMA.    ROS   No chest pain, no shortness of breath, no abdominal pain  Positive ROS as per HPI.  All other systems reviewed and are negative.     Objective:     /70 (BP Location: Left arm, Patient Position: Sitting, BP Cuff Size: Adult)   Pulse 77   Temp 36.4 °C (97.6 °F) (Temporal)   Resp 16   Ht 1.803 m (5' 11\")   Wt 112 kg (247 lb 3.2 " oz)  Body mass index is 34.48 kg/m².   Physical Exam    Constitutional: Alert, no distress.  Skin: Warm, dry, good turgor.  Rash on most of the upper extremity noted to have fine small little erythematous lesions dotted throughout the arms and the upper torso dry skin noted as well.  Eye: Equal, round and reactive, conjunctiva clear, lids normal.  ENMT: Lips without lesions, good dentition, oropharynx clear.  Neck: Trachea midline, no masses, no thyromegaly.   Psych: Alert and oriented x3, normal affect and mood.      Results  Laboratory Studies  Prediabetes is worsening.        Assessment and Plan:   The following treatment plan was discussed    Assessment & Plan  1. Acute, new onset rash.  The condition is primarily covering the majority of the body, suggesting that the skin appears dry. A tapering course of prednisone will be provided, along with a topical steroid cream to be applied as directed. The patient is advised to use the steroid cream sparingly if the areas exhibit significant itching. The use of over-the-counter Vanicream and petroleum-based jelly gel post-bathing was discussed. Hydration should be maintained. A referral to dermatology will be made in the event of no improvement.    2. Prediabetes, chronic condition.  The patient has made dietary modifications. The steroid regimen could potentially increase glucose levels, hence the patient should be more vigilant about solidifying a healthy diet.      ORDERS:  1. Rash    - predniSONE (DELTASONE) 20 MG Tab; Take 3 Tablets by mouth every day for 3 days, THEN 2 Tablets every day for 3 days, THEN 1 Tablet every day for 3 days.  Dispense: 18 Tablet; Refill: 0  - triamcinolone acetonide (KENALOG) 0.1 % Cream; Apply 1 Application topically 3 times a day for 14 days.  Dispense: 80 g; Refill: 5  - Referral to Dermatology    2. Prediabetes          Please note that this dictation was created using voice recognition software. I have made every reasonable attempt  to correct obvious errors, but I expect that there are errors of grammar and possibly content that I did not discover before finalizing the note.      Attestation      Verbal consent was acquired by the patient to use [employer] ambient listening note generation during this visit Yes

## 2024-07-10 ENCOUNTER — OFFICE VISIT (OUTPATIENT)
Dept: URGENT CARE | Facility: CLINIC | Age: 44
End: 2024-07-10
Payer: OTHER GOVERNMENT

## 2024-07-10 VITALS
OXYGEN SATURATION: 95 % | HEART RATE: 91 BPM | TEMPERATURE: 98.8 F | RESPIRATION RATE: 18 BRPM | SYSTOLIC BLOOD PRESSURE: 124 MMHG | WEIGHT: 247 LBS | BODY MASS INDEX: 34.58 KG/M2 | HEIGHT: 71 IN | DIASTOLIC BLOOD PRESSURE: 78 MMHG

## 2024-07-10 DIAGNOSIS — U07.1 COVID-19 VIRUS INFECTION: ICD-10-CM

## 2024-07-10 PROCEDURE — 3074F SYST BP LT 130 MM HG: CPT | Performed by: PHYSICIAN ASSISTANT

## 2024-07-10 PROCEDURE — 99213 OFFICE O/P EST LOW 20 MIN: CPT | Performed by: PHYSICIAN ASSISTANT

## 2024-07-10 PROCEDURE — 3078F DIAST BP <80 MM HG: CPT | Performed by: PHYSICIAN ASSISTANT

## 2024-07-10 ASSESSMENT — ENCOUNTER SYMPTOMS
SORE THROAT: 1
VOMITING: 0
NAUSEA: 0
FEVER: 0
ABDOMINAL PAIN: 0
CHILLS: 1
COUGH: 1
SHORTNESS OF BREATH: 0
HEADACHES: 1
DIARRHEA: 0
MYALGIAS: 0

## 2024-07-10 ASSESSMENT — FIBROSIS 4 INDEX: FIB4 SCORE: 0.83

## 2024-09-03 ENCOUNTER — APPOINTMENT (OUTPATIENT)
Dept: MEDICAL GROUP | Facility: MEDICAL CENTER | Age: 44
End: 2024-09-03
Payer: OTHER GOVERNMENT

## 2024-09-03 VITALS
HEART RATE: 74 BPM | HEIGHT: 71 IN | BODY MASS INDEX: 35.95 KG/M2 | RESPIRATION RATE: 16 BRPM | TEMPERATURE: 97.3 F | DIASTOLIC BLOOD PRESSURE: 70 MMHG | SYSTOLIC BLOOD PRESSURE: 110 MMHG | OXYGEN SATURATION: 93 % | WEIGHT: 256.8 LBS

## 2024-09-03 DIAGNOSIS — E78.2 MIXED HYPERLIPIDEMIA: ICD-10-CM

## 2024-09-03 DIAGNOSIS — Z15.09 BRCA POSITIVE: ICD-10-CM

## 2024-09-03 DIAGNOSIS — Z12.5 SCREENING PSA (PROSTATE SPECIFIC ANTIGEN): ICD-10-CM

## 2024-09-03 DIAGNOSIS — E55.9 HYPOVITAMINOSIS D: ICD-10-CM

## 2024-09-03 DIAGNOSIS — Z15.01 BRCA POSITIVE: ICD-10-CM

## 2024-09-03 DIAGNOSIS — R73.03 PREDIABETES: ICD-10-CM

## 2024-09-03 PROBLEM — D72.829 LEUKOCYTOSIS: Status: RESOLVED | Noted: 2021-11-22 | Resolved: 2024-09-03

## 2024-09-03 PROCEDURE — 3078F DIAST BP <80 MM HG: CPT | Performed by: PHYSICIAN ASSISTANT

## 2024-09-03 PROCEDURE — 99214 OFFICE O/P EST MOD 30 MIN: CPT | Performed by: PHYSICIAN ASSISTANT

## 2024-09-03 PROCEDURE — 3074F SYST BP LT 130 MM HG: CPT | Performed by: PHYSICIAN ASSISTANT

## 2024-09-03 RX ORDER — CHOLECALCIFEROL (VITAMIN D3) 125 MCG
1 CAPSULE ORAL DAILY
COMMUNITY

## 2024-09-03 ASSESSMENT — FIBROSIS 4 INDEX: FIB4 SCORE: 0.85

## 2024-09-03 NOTE — PROGRESS NOTES
"Subjective:     History of Present Illness  The patient presents for evaluation of multiple medical concerns.    He has been maintaining good health for over a month and feels strong without recent illness. His current exercise routine includes weight training four times a week and cardio two to three times a week. He has reduced his red meat intake to once or twice a week and is mindful of portion sizes. Despite a slight weight gain, he has lost inches around his waist. He plans to further improve his diet now that he is home from traveling for the past 6 weeks.    He tested positive for BRCA 1 and 2, which he undertook primarily for his daughters. During his BRCA test review, he was advised to undergo PSA screening. He was also advised to start taking creatine by his , which he had stopped 20 years ago due to low back pain.    He is currently taking vitamin D supplements (5000 IU), a multivitamin, D3, and B12. He has appointments with Pulmonary and Sleep Medicine on Monday and Friday next week.      Current medicines (including changes today)  Current Outpatient Medications   Medication Sig Dispense Refill    Cholecalciferol (VITAMIN D) 125 MCG (5000 UT) Cap Take 1 Capsule by mouth every day.      Fexofenadine-Pseudoephedrine (ALLEGRA-D 12 HOUR PO) Take 1 Tablet by mouth every 12 hours.       No current facility-administered medications for this visit.     He  has a past medical history of Apnea, sleep, Daytime sleepiness, Patient denies medical problems, and Snoring.    He has no past medical history of Allergy or ASTHMA.    ROS   No chest pain, no shortness of breath, no abdominal pain  Positive ROS as per HPI.  All other systems reviewed and are negative.     Objective:     /70 (BP Location: Left arm, Patient Position: Sitting, BP Cuff Size: Large adult)   Pulse 74   Temp 36.3 °C (97.3 °F) (Temporal)   Resp 16   Ht 1.803 m (5' 11\")   Wt 116 kg (256 lb 12.8 oz)   SpO2 93%  Body mass index is " 35.82 kg/m².   Physical Exam    Constitutional: Alert, no distress.  Skin: Warm, dry, good turgor, no rashes in visible areas.  Eye: Equal, round and reactive, conjunctiva clear, lids normal.  ENMT: Lips without lesions, good dentition, oropharynx clear.  Neck: Trachea midline, no masses, no thyromegaly.  Psych: Alert and oriented x3, normal affect and mood.      Results  Laboratory Studies  A1c was 6.1. Triglycerides were high. Total cholesterol and LDL went down.    Testing  BRCA test positive for one and two.        Assessment and Plan:   The following treatment plan was discussed    Assessment & Plan  1. BRCA Positive.  Given his positive BRCA status, it is crucial to monitor his health closely. His two daughters will also be screened when they reach adulthood or turn 18. A PSA screening has been ordered as per genetic screening guidelines, to be completed before the next appointment.    2. Prediabetes.  This is a chronic condition that showed slight worsening in the last lab results, with an A1c of 6.1. He is advised to continue with dietary modifications. Lab tests, including A1c, have been ordered to be done before the next appointment, within 6 months.    3. Mixed Hyperlipidemia.  This is a chronic condition, with triglycerides nearly reaching 300. He is advised to continue with lifestyle modifications. A cholesterol profile will be checked after fasting for 8 hours.    4. Hypovitaminosis D.  This is a chronic condition. He is advised to continue with the vitamin D3 supplement at 5000 IU daily. A vitamin D level check will be conducted during the follow-up.    Follow-up  A follow-up visit is scheduled in 6 months.      ORDERS:  1. BRCA positive    - PROSTATE SPECIFIC AG SCREENING; Future    2. Screening PSA (prostate specific antigen)    - PROSTATE SPECIFIC AG SCREENING; Future    3. Prediabetes    - HEMOGLOBIN A1C; Future    4. Mixed hyperlipidemia    - Lipid Profile; Future    5. Hypovitaminosis D    -  VITAMIN D,25 HYDROXY (DEFICIENCY); Future        Please note that this dictation was created using voice recognition software. I have made every reasonable attempt to correct obvious errors, but I expect that there are errors of grammar and possibly content that I did not discover before finalizing the note.      Attestation      Verbal consent was acquired by the patient to use [employer] ambient listening note generation during this visit Yes

## 2024-09-09 ENCOUNTER — SLEEP STUDY (OUTPATIENT)
Dept: SLEEP MEDICINE | Facility: MEDICAL CENTER | Age: 44
End: 2024-09-09
Attending: STUDENT IN AN ORGANIZED HEALTH CARE EDUCATION/TRAINING PROGRAM
Payer: OTHER GOVERNMENT

## 2024-09-09 DIAGNOSIS — R06.81 WITNESSED EPISODE OF APNEA: ICD-10-CM

## 2024-09-09 DIAGNOSIS — G47.30 SLEEP DISORDER BREATHING: ICD-10-CM

## 2024-09-09 DIAGNOSIS — G47.19 EXCESSIVE DAYTIME SLEEPINESS: ICD-10-CM

## 2024-09-09 PROCEDURE — 95811 POLYSOM 6/>YRS CPAP 4/> PARM: CPT | Performed by: STUDENT IN AN ORGANIZED HEALTH CARE EDUCATION/TRAINING PROGRAM

## 2024-09-10 NOTE — PROCEDURES
Patient: JAY KIRK  ID: [id number] Date: 9/9/2024   MONTAGE: Standard  STUDY TYPE: Split Night  RECORDING TECHNIQUE:   After the scalp was prepared, gold plated electrodes were applied to the scalp according to the International 10-20 System. EEG (electroencephalogram) was continuously monitored from the O1-M2, O2-M1, C3-M2, C4-M1, F3-M2, and F4-M1. EOGs (electrooculograms) were monitored by electrodes placed at the left and right outer canthi. Chin EMG (electromyogram) was monitored by electrodes placed on the mentalis and sub-mentalis muscles. Nasal and oral airflow were monitored using a triple port thermocouple as well as oronasal pressure transducer. Respiratory effort was measured by inductive plethysmography technology employing abdominal and thoracic belts. Blood oxygen saturation and pulse were monitored by pulse oximetry. Heart rhythm was monitored by surface electrocardiogram. Leg EMG was studied using surface electrodes placed on left and right anterior tibialis. A microphone was used to monitor tracheal sounds and snoring. Body position was monitored and documented by technician observation.   SCORING CRITERIA:   A modification of the AASM manual for scoring of sleep and associated events was used. Obstructive apneas were scored by cessation of airflow for at least 10 seconds with continuing respiratory effort. Central apneas were scored by cessation of airflow for at least 10 seconds with no respiratory effort. Hypopneas were scored by a 30% or more reduction in airflow for at least 10 seconds accompanied by arterial oxygen desaturation of 3% or an arousal. For CMS (Medicare) patients, per AASM rule 1B, hypopneas are scored by 30% with mild reduction in airflow for at least 10 seconds accompanied by arterial saturation decreased at 4%.  DIAGNOSTIC  Study start time was 10:14:04 PM. Diagnostic recording time was 159 minutes with a total sleep time of 142 minutes resulting in a sleep efficiency of  89.62%%. Sleep latency from the start of the study was 03 minutes and the latency from sleep to REM was 00 minutes. In total,60 arousals were scored for an arousal index of 25.3.  Respiratory:  There were a total of 81 apneas consisting of 78 obstructive apneas, 0 mixed apneas, and 3 central apneas. A total of 62 hypopneas were scored. The apnea index was 34.11 per hour and the hypopnea index was 26.11 per hour resulting in an overall AHI of 60.21. AHI during REM was 0.0 and AHI while supine was 78.87.  Oximetry:  There was a mean oxygen saturation of 88.0%. The minimum oxygen saturation during NREM sleep was 81.0% and in REM was --%. Time spent during sleep with oxygen saturations <88% was 104.9 minutes.   Cardiac:  The highest heart rate seen while awake was 100 BPM while the highest heart rate during sleep was 91 BPM with an average sleeping heart rate of 66 BPM.  Limb Movements:  There were a total of 0 PLMs during sleep, which resulted in a PLM index of 0.0. There were 0 PLMs associated with arousals which resulted in a PLMS arousal index of 0.0.    TREATMENT:  Treatment recording time was 5h 12.0m (312 minutes) with a total sleep time of 4h 50.5m (290 minutes) resulting in a sleep efficiency of 93.1%. Sleep latency from the start of treatment was 06 minutes and REM latency from sleep onset was 0h 52.0m. The patient had 64 arousals in total for an arousal index of 13.2.  Respiratory:   There were 5 apneas in total consisting of 1 obstructive apneas, 4 central apneas, and 0 mixed apneas for an apnea index of 1.03. The patient had 44 hypopneas in total, which resulted in a hypopnea index of 9.09. The overall AHI was 10.12, with a REM AHI of 8.84, and a supine AHI of 19.89.   Oximetry:  The mean SaO2 during treatment was 91.0%. The minimum oxygen saturation in NREM was 83.0 % and in REM was 83.0%. Patient spent 44.0 minutes of TST with SaO2 <88%.  Cardiac:  The highest heart rate during sleep was 85 BPM with an  average sleeping heart rate of 63BPM.  Limb Movements:  There were a total of 0 PLMS during titration sleep time that resulted in an index of 0.0. There were 0 PLMS associated with arousals. This resulted in a PLM arousal index of 0.0.  Titration:   CPAP was tried from 5 to 13  This was a fully attended sleep study. This test was technically adequate. This patient was titrated on CPAP starting at 5 cm of water pressure. Patient was titrated up to 13 cm of water pressure. Patient did best at 12 cm of water pressure. Patient spent 97 minutes at that pressure and the AHI was 2.5 which is considered treated obstructive sleep apnea.     Impression:  1.  Severe obstructive sleep apnea with an overall AHI of 60.2 events an hour  2.  Nocturnal hypoxia due to untreated sleep apnea with time at or below 80% saturation during diagnostic portion of 105 minutes  3.  Patient met criteria for split-night protocol was placed on CPAP  4.  Supine REM sleep was seen on CPAP therapy  5.  Oxygen saturations appear to improve with increasing PAP pressure    Recommendations:  I recommend auto CPAP 9-14 cmH2O.  Patient used a large AirFit F20 mask during night of study.    I also recommend 30 day compliance download to assess the efficacy to the recommended pressure, measure leak, apnea hypopnea index and compliance for further outpatient monitoring and management of PAP therapy. In some cases alternative treatment options may be proven effective in resolving sleep apnea. These options include upper airway surgery, the use of a dental orthotic, weight loss, or positional therapy. Clinical correlation is required. In general patients with sleep apnea are advised to avoid alcohol, sedatives and not to operate a motor vehicle while drowsy.  Untreated sleep apnea increases the risk for cardiovascular and neurovascular disease.

## 2024-09-13 ENCOUNTER — OFFICE VISIT (OUTPATIENT)
Dept: SLEEP MEDICINE | Facility: MEDICAL CENTER | Age: 44
End: 2024-09-13
Attending: STUDENT IN AN ORGANIZED HEALTH CARE EDUCATION/TRAINING PROGRAM
Payer: OTHER GOVERNMENT

## 2024-09-13 VITALS
DIASTOLIC BLOOD PRESSURE: 84 MMHG | SYSTOLIC BLOOD PRESSURE: 120 MMHG | RESPIRATION RATE: 16 BRPM | HEART RATE: 78 BPM | WEIGHT: 245 LBS | BODY MASS INDEX: 35.07 KG/M2 | HEIGHT: 70 IN | OXYGEN SATURATION: 92 %

## 2024-09-13 DIAGNOSIS — G47.33 OSA (OBSTRUCTIVE SLEEP APNEA): Primary | ICD-10-CM

## 2024-09-13 DIAGNOSIS — G47.19 EXCESSIVE DAYTIME SLEEPINESS: ICD-10-CM

## 2024-09-13 PROCEDURE — 99213 OFFICE O/P EST LOW 20 MIN: CPT | Performed by: STUDENT IN AN ORGANIZED HEALTH CARE EDUCATION/TRAINING PROGRAM

## 2024-09-13 PROCEDURE — 3074F SYST BP LT 130 MM HG: CPT | Performed by: STUDENT IN AN ORGANIZED HEALTH CARE EDUCATION/TRAINING PROGRAM

## 2024-09-13 PROCEDURE — 3079F DIAST BP 80-89 MM HG: CPT | Performed by: STUDENT IN AN ORGANIZED HEALTH CARE EDUCATION/TRAINING PROGRAM

## 2024-09-13 PROCEDURE — 99211 OFF/OP EST MAY X REQ PHY/QHP: CPT | Performed by: STUDENT IN AN ORGANIZED HEALTH CARE EDUCATION/TRAINING PROGRAM

## 2024-09-13 ASSESSMENT — FIBROSIS 4 INDEX: FIB4 SCORE: 0.85

## 2024-09-13 NOTE — PROGRESS NOTES
[facility] Follow-up Visit    CC: Follow-up to discuss sleep study results      HPI:  Brandon Polanco is a 44 y.o.male  with obesity, seasonal allergies, and severe obstructive sleep apnea and nocturnal hypoxia.  Presents today to discuss sleep study results.    Reports night the sleep study went well.  He was surprised that he slept as well as he did with all of the wires and instrumentation in place.  He did not find CPAP to be too intrusive to his sleep.  He states the next day he did notice that his sleep felt more restorative.    He did review the study results prior to today's visit and is curious to talk about options moving forward.    At last visit it was discussed that he does have daytime tiredness and daytime low energy.  Gardiner Sleepiness Scale is elevated at 13.  He does snore and have interruptions to his breathing at night.      Sleep History  9/9/2024 PSG split-night study showed severe obstructive sleep apnea with an overall AHI of 60 events an hour, time out of below 88% saturation during diagnostic portion of 105 minutes.  Patient placed on CPAP and respiratory events did improve with CPAP therapy.  Is recommended to be on CPAP min a pressure 9 maximum pressure 14    Patient Active Problem List    Diagnosis Date Noted    BRCA positive 09/03/2024    Mixed hyperlipidemia 09/03/2024    Rash 06/25/2024    Agatston coronary artery calcium score less than 100 05/28/2024    Witnessed episode of apnea 05/24/2024    Atypical chest pain 05/24/2024    Seasonal allergies 04/26/2024    Hypovitaminosis D 04/26/2024    Prediabetes 11/22/2021    Frontal sinusitis 09/24/2021    Obesity (BMI 30-39.9) 09/24/2021    Family history of breast cancer in mother 06/29/2018    FHx: BRCA2 gene positive 06/29/2018       Past Medical History:   Diagnosis Date    Apnea, sleep     Daytime sleepiness     Patient denies medical problems     Snoring         Past Surgical History:   Procedure Laterality Date    EYE  SURGERY      lasik    VASECTOMY         Family History   Problem Relation Age of Onset    Cancer Mother         breast- met    Heart Disease Maternal Grandmother     Heart Disease Maternal Grandfather     Heart Disease Paternal Grandmother     Heart Disease Paternal Grandfather     Diabetes Neg Hx     Stroke Neg Hx        Social History     Socioeconomic History    Marital status:      Spouse name: Not on file    Number of children: Not on file    Years of education: Not on file    Highest education level: Not on file   Occupational History    Not on file   Tobacco Use    Smoking status: Never    Smokeless tobacco: Never   Vaping Use    Vaping status: Never Used   Substance and Sexual Activity    Alcohol use: Yes     Alcohol/week: 4.2 oz     Types: 7 Shots of liquor per week     Comment: 1drink/night    Drug use: No    Sexual activity: Yes     Partners: Female     Birth control/protection: Surgical     Comment: , 2 daughters, [employer]   Other Topics Concern    Not on file   Social History Narrative    Not on file     Social Determinants of Health     Financial Resource Strain: Not on file   Food Insecurity: Not on file   Transportation Needs: Not on file   Physical Activity: Not on file   Stress: Not on file   Social Connections: Not on file   Intimate Partner Violence: Not on file   Housing Stability: Not on file       Current Outpatient Medications   Medication Sig Dispense Refill    Cholecalciferol (VITAMIN D) 125 MCG (5000 UT) Cap Take 1 Capsule by mouth every day.      Fexofenadine-Pseudoephedrine (ALLEGRA-D 12 HOUR PO) Take 1 Tablet by mouth every 12 hours.       No current facility-administered medications for this visit.        ALLERGIES: Patient has no known allergies.    ROS  Constitutional: Denies fevers, Denies weight changes  Ears/Nose/Throat/Mouth: Denies nasal congestion or sore throat   Cardiovascular: Denies chest pain  Respiratory: Denies shortness of breath, Denies  "cough  Gastrointestinal/Hepatic: Denies nausea, vomiting  Sleep: see HPI      PHYSICAL EXAM  /84 (BP Location: Left arm, Patient Position: Sitting, BP Cuff Size: Adult)   Pulse 78   Resp 16   Ht 1.778 m (5' 10\")   Wt 111 kg (245 lb)   SpO2 92%   BMI 35.15 kg/m²   Appearance: Well-nourished, well-developed, no acute distress  Eyes:  No scleral icterus , EOMI  Musculoskeletal:  Grossly normal; gait and station normal; digits and nails normal  Skin:  No rashes, petechiae, cyanosis  Neurologic: without focal signs; oriented to person, time, place, and purpose; judgement intact      Medical Decision Making   Assessment and Plan  Brandon Polanco is a 44 y.o.male  with obesity, seasonal allergies, and severe obstructive sleep apnea and nocturnal hypoxia.  Presents today to discuss sleep study results.    The medical record was reviewed.    Obstructive sleep apnea   Reviewed recent PSG split-night with patient showing an AHI of 60,  and Min Oxygen saturation of 81%.  Time at or below 88% saturation 105 minutes  Based on sleep study and symptoms meets criteria for severe obstructive sleep apnea.   We discussed the pathophysiology of obstructive sleep apnea (ABEBE) and risk factors for the disease. We also discussed possible consequences of untreated ABEBE, including excessive daytime sleepiness and fatigue, cognitive dysfunction, cardiovascular complications such as elevated blood pressure, heart attacks, cardiac arrhythmias, and strokes. We discussed how ABEBE typically gets worse with age. We discussed treatment options for ABEBE, including the gold standard therapy (PAP).    Reviewed that he responded well to CPAP therapy.  Based on the sleep study recommend starting home auto CPAP therapy 9-14 cmH2O    RECOMMENDATIONS  -Start Auto CPAP at pressures 9-14 cm H2O  -Discussed importance of adherence/compliance   -Prescription generated for supplies   -Patient counseled to avoid driving when sleepy. Encouraged to " anticipate sleepiness, consider taking a 10 min nap prior to driving, alternate with another , or pull over if sleepy while driving  -Advised to contact our office or myself with any questions via [employer]  -Follow up in 3 months or sooner if needed    Return in about 3 months (around 12/13/2024).      Please note portions of this record was created using voice recognition software. I have made every reasonable attempt to correct obvious errors, but I expect that there are errors of grammar and possibly content I did not discover before finalizing the note.

## 2024-09-16 DIAGNOSIS — G47.33 OSA (OBSTRUCTIVE SLEEP APNEA): ICD-10-CM

## 2024-10-18 ENCOUNTER — OFFICE VISIT (OUTPATIENT)
Dept: MEDICAL GROUP | Facility: MEDICAL CENTER | Age: 44
End: 2024-10-18
Payer: OTHER GOVERNMENT

## 2024-10-18 VITALS
OXYGEN SATURATION: 95 % | HEIGHT: 70 IN | BODY MASS INDEX: 36.33 KG/M2 | TEMPERATURE: 96.4 F | DIASTOLIC BLOOD PRESSURE: 70 MMHG | HEART RATE: 97 BPM | SYSTOLIC BLOOD PRESSURE: 110 MMHG | WEIGHT: 253.8 LBS

## 2024-10-18 DIAGNOSIS — M25.512 ACUTE PAIN OF LEFT SHOULDER: ICD-10-CM

## 2024-10-18 DIAGNOSIS — G47.33 OSA (OBSTRUCTIVE SLEEP APNEA): ICD-10-CM

## 2024-10-18 PROBLEM — R07.89 ATYPICAL CHEST PAIN: Status: RESOLVED | Noted: 2024-05-24 | Resolved: 2024-10-18

## 2024-10-18 PROCEDURE — 3074F SYST BP LT 130 MM HG: CPT | Performed by: PHYSICIAN ASSISTANT

## 2024-10-18 PROCEDURE — 3078F DIAST BP <80 MM HG: CPT | Performed by: PHYSICIAN ASSISTANT

## 2024-10-18 PROCEDURE — 99214 OFFICE O/P EST MOD 30 MIN: CPT | Performed by: PHYSICIAN ASSISTANT

## 2024-10-18 RX ORDER — METHYLPREDNISOLONE 4 MG/1
TABLET ORAL
Qty: 21 TABLET | Refills: 0 | Status: SHIPPED | OUTPATIENT
Start: 2024-10-18

## 2024-10-18 RX ORDER — MELOXICAM 15 MG/1
15 TABLET ORAL DAILY
Qty: 30 TABLET | Refills: 1 | Status: SHIPPED | OUTPATIENT
Start: 2024-10-18 | End: 2024-12-17

## 2024-10-18 ASSESSMENT — FIBROSIS 4 INDEX: FIB4 SCORE: 0.85

## 2024-11-07 ENCOUNTER — OFFICE VISIT (OUTPATIENT)
Dept: URGENT CARE | Facility: CLINIC | Age: 44
End: 2024-11-07
Payer: OTHER GOVERNMENT

## 2024-11-07 VITALS
TEMPERATURE: 96.5 F | RESPIRATION RATE: 16 BRPM | BODY MASS INDEX: 35.79 KG/M2 | SYSTOLIC BLOOD PRESSURE: 104 MMHG | DIASTOLIC BLOOD PRESSURE: 72 MMHG | WEIGHT: 250 LBS | OXYGEN SATURATION: 95 % | HEIGHT: 70 IN | HEART RATE: 80 BPM

## 2024-11-07 DIAGNOSIS — R05.1 ACUTE COUGH: ICD-10-CM

## 2024-11-07 DIAGNOSIS — J45.21 MILD INTERMITTENT ASTHMA WITH EXACERBATION: ICD-10-CM

## 2024-11-07 DIAGNOSIS — H66.003 NON-RECURRENT ACUTE SUPPURATIVE OTITIS MEDIA OF BOTH EARS WITHOUT SPONTANEOUS RUPTURE OF TYMPANIC MEMBRANES: ICD-10-CM

## 2024-11-07 PROCEDURE — 3078F DIAST BP <80 MM HG: CPT

## 2024-11-07 PROCEDURE — 99213 OFFICE O/P EST LOW 20 MIN: CPT

## 2024-11-07 PROCEDURE — 3074F SYST BP LT 130 MM HG: CPT

## 2024-11-07 PROCEDURE — 0241U POCT CEPHEID COV-2, FLU A/B, RSV - PCR: CPT

## 2024-11-07 RX ORDER — METHYLPREDNISOLONE 4 MG/1
TABLET ORAL
Qty: 21 TABLET | Refills: 0 | Status: SHIPPED | OUTPATIENT
Start: 2024-11-07

## 2024-11-07 ASSESSMENT — FIBROSIS 4 INDEX: FIB4 SCORE: 0.85

## 2024-11-08 NOTE — PROGRESS NOTES
Subjective:   Brandon Polanco is a 44 y.o. male who presents for Fever (Ears feel clogged, sinus pressure,  brown phlegm, cough x 12 days )      HPI:    Patient presents to urgent care with concerns of almost two weeks of rhinorrhea, nasal congestion, headache, sore throat, cough.  States he is coughing up brown phlegm. His ears feel clogged  Mild improvement with Advil, warm showers, nasal rinses, antihistamines.   Reports worsening of symptoms in the past 1-2 days.   Endorses intermittent wheezing, chest tightness,  denies SOB. Has history of asthma typically well controlled, sometimes is exacerbated with illnesses. Denies hospitalizations in the past 12 months  Denies fever, body aches  Tolerating diet. He feel tired.  Denies known sick contacts.     ROS As above in HPI    Medications:    Current Outpatient Medications on File Prior to Visit   Medication Sig Dispense Refill    meloxicam (MOBIC) 15 MG tablet Take 1 Tablet by mouth every day for 60 days. (Patient not taking: Reported on 11/7/2024) 30 Tablet 1    Cholecalciferol (VITAMIN D) 125 MCG (5000 UT) Cap Take 1 Capsule by mouth every day. (Patient not taking: Reported on 11/7/2024)      Fexofenadine-Pseudoephedrine (ALLEGRA-D 12 HOUR PO) Take 1 Tablet by mouth every 12 hours. (Patient not taking: Reported on 11/7/2024)       No current facility-administered medications on file prior to visit.        Allergies:   Patient has no known allergies.    Problem List:   Patient Active Problem List   Diagnosis    Family history of breast cancer in mother    FHx: BRCA2 gene positive    Frontal sinusitis    Obesity (BMI 30-39.9)    Prediabetes    Seasonal allergies    Hypovitaminosis D    ABEBE (obstructive sleep apnea)    Agatston coronary artery calcium score less than 100    Rash    BRCA positive    Mixed hyperlipidemia    Acute pain of left shoulder        Surgical History:  Past Surgical History:   Procedure Laterality Date    EYE SURGERY      lasik    VASECTOMY    "      Past Social Hx:   Social History     Tobacco Use    Smoking status: Never    Smokeless tobacco: Never   Vaping Use    Vaping status: Never Used   Substance Use Topics    Alcohol use: Yes     Alcohol/week: 4.2 oz     Types: 7 Shots of liquor per week     Comment: 1drink/night    Drug use: No          Problem list, medications, and allergies reviewed by myself today in Epic.     Objective:     /72 (BP Location: Right arm, Patient Position: Sitting, BP Cuff Size: Adult long)   Pulse 80   Temp 35.8 °C (96.5 °F) (Temporal)   Resp 16   Ht 1.778 m (5' 10\")   Wt 113 kg (250 lb)   SpO2 95%   BMI 35.87 kg/m²     Physical Exam  Vitals and nursing note reviewed.   Constitutional:       General: He is not in acute distress.     Appearance: Normal appearance. He is not ill-appearing or diaphoretic.   HENT:      Head: Normocephalic.      Right Ear: Ear canal normal. A middle ear effusion is present. Tympanic membrane is erythematous and bulging.      Left Ear: Ear canal normal. A middle ear effusion is present. Tympanic membrane is erythematous and bulging.      Nose: Mucosal edema, congestion and rhinorrhea present. Rhinorrhea is purulent.      Right Sinus: Maxillary sinus tenderness present.      Left Sinus: Maxillary sinus tenderness present.      Mouth/Throat:      Mouth: Mucous membranes are moist.      Pharynx: Oropharynx is clear. Posterior oropharyngeal erythema and postnasal drip (moderate) present. No pharyngeal swelling.   Cardiovascular:      Rate and Rhythm: Normal rate and regular rhythm.      Heart sounds: Normal heart sounds. No murmur heard.     No friction rub. No gallop.   Pulmonary:      Effort: Pulmonary effort is normal. No respiratory distress.      Breath sounds: Normal breath sounds. No stridor. No wheezing, rhonchi or rales.   Chest:      Chest wall: No tenderness.   Abdominal:      General: Bowel sounds are normal.      Palpations: Abdomen is soft.   Musculoskeletal:      Cervical " back: No rigidity or tenderness.   Lymphadenopathy:      Cervical: Cervical adenopathy present.   Skin:     General: Skin is warm and dry.      Capillary Refill: Capillary refill takes less than 2 seconds.      Findings: No rash.   Neurological:      Mental Status: He is alert and oriented to person, place, and time.         Assessment/Plan:       Results for orders placed or performed in visit on 11/07/24   POCT Cepheid CoV-2, Flu A/B, RSV - PCR    Collection Time: 11/07/24  4:55 PM   Result Value Ref Range    SARS-CoV-2 by PCR Negative Negative, Invalid    Influenza virus A RNA Negative Negative, Invalid    Influenza virus B, PCR Negative Negative, Invalid    RSV, PCR Negative Negative, Invalid       Diagnosis and associated orders:   1. Non-recurrent acute suppurative otitis media of both ears without spontaneous rupture of tympanic membranes  - amoxicillin-clavulanate (AUGMENTIN) 875-125 MG Tab; Take 1 Tablet by mouth 2 times a day for 7 days.  Dispense: 14 Tablet; Refill: 0    2. Acute cough  - POCT Cepheid CoV-2, Flu A/B, RSV - PCR    3. Mild intermittent asthma with exacerbation  - methylPREDNISolone (MEDROL DOSEPAK) 4 MG Tablet Therapy Pack; Follow schedule on package instructions.  Dispense: 21 Tablet; Refill: 0        Comments/MDM:     Tylenol/ibuprofen as needed for pain per package instructions  Continue with otc antihistamines, flonase, decongestant, sinus rinses  Medrol dose pack ordered in case he develops worsening asthma symptoms. No signs of respiratory distress. Declined breathing treatment in office.   Return if no improvement in 48 to 72 hours  Follow-up with primary care advised         Return to clinic or go to ED if symptoms worsen or persist. Indications for ED discussed at length. Patient/Parent/Guardian voices understanding. Follow-up with your primary care provider in 3-5 days. Red flag symptoms discussed. All side effects of medication discussed including allergic response, GI upset,  tendon injury, rash, sedation etc.    Please note that this dictation was created using voice recognition software. I have made a reasonable attempt to correct obvious errors, but I expect that there are errors of grammar and possibly content that I did not discover before finalizing the note.    This note was electronically signed by ORACIO Lo

## 2024-12-18 ENCOUNTER — OFFICE VISIT (OUTPATIENT)
Dept: SLEEP MEDICINE | Facility: MEDICAL CENTER | Age: 44
End: 2024-12-18
Attending: STUDENT IN AN ORGANIZED HEALTH CARE EDUCATION/TRAINING PROGRAM
Payer: OTHER GOVERNMENT

## 2024-12-18 VITALS
HEIGHT: 71 IN | HEART RATE: 74 BPM | BODY MASS INDEX: 32.9 KG/M2 | RESPIRATION RATE: 16 BRPM | DIASTOLIC BLOOD PRESSURE: 72 MMHG | OXYGEN SATURATION: 95 % | WEIGHT: 235 LBS | SYSTOLIC BLOOD PRESSURE: 110 MMHG

## 2024-12-18 DIAGNOSIS — G47.33 OSA (OBSTRUCTIVE SLEEP APNEA): ICD-10-CM

## 2024-12-18 PROCEDURE — 3074F SYST BP LT 130 MM HG: CPT | Performed by: STUDENT IN AN ORGANIZED HEALTH CARE EDUCATION/TRAINING PROGRAM

## 2024-12-18 PROCEDURE — 3078F DIAST BP <80 MM HG: CPT | Performed by: STUDENT IN AN ORGANIZED HEALTH CARE EDUCATION/TRAINING PROGRAM

## 2024-12-18 PROCEDURE — 99212 OFFICE O/P EST SF 10 MIN: CPT | Performed by: STUDENT IN AN ORGANIZED HEALTH CARE EDUCATION/TRAINING PROGRAM

## 2024-12-18 PROCEDURE — 99213 OFFICE O/P EST LOW 20 MIN: CPT | Performed by: STUDENT IN AN ORGANIZED HEALTH CARE EDUCATION/TRAINING PROGRAM

## 2024-12-18 ASSESSMENT — FIBROSIS 4 INDEX: FIB4 SCORE: 0.85

## 2024-12-18 NOTE — PROGRESS NOTES
[facility] Follow-up Visit    CC: Follow-up regarding management of obstructive sleep apnea      HPI:  Brandon Polanco is a 44 y.o.male  with seasonal allergies, obesity, and severe obstructive sleep apnea on CPAP.  Presents today to follow-up regarding management of obstructive sleep apnea.    Since his last visit he has received a CPAP machine.  He is finding with using CPAP he has noticed great benefit.  He has more energy during the day and wakes up more rested.  He is no longer needing to take naps during the day.  His wife is reported that he is sleeping more soundly and his snoring has disappeared.  He has no acute complaints at this time.  States overall he is happy and plans to continue using his machine regularly.    DME provider: CPAP and more   Device: Airsense 11   Mask: hybrid fullface   Aerophagia: No   Snoring: No   Dry mouth: slight   Leak: No   Skin irritation: No   Chin strap: No     Sleep History  9/9/2024 PSG split-night study showed severe obstructive sleep apnea with an overall AHI of 60 events an hour, time out of below 88% saturation during diagnostic portion of 105 minutes.  Patient placed on CPAP and respiratory events did improve with CPAP therapy.  Is recommended to be on CPAP min a pressure 9 maximum pressure 14    Patient Active Problem List    Diagnosis Date Noted    Acute pain of left shoulder 10/18/2024    BRCA positive 09/03/2024    Mixed hyperlipidemia 09/03/2024    Rash 06/25/2024    Agatston coronary artery calcium score less than 100 05/28/2024    ABEBE (obstructive sleep apnea) 05/24/2024    Seasonal allergies 04/26/2024    Hypovitaminosis D 04/26/2024    Prediabetes 11/22/2021    Frontal sinusitis 09/24/2021    Obesity (BMI 30-39.9) 09/24/2021    Family history of breast cancer in mother 06/29/2018    FHx: BRCA2 gene positive 06/29/2018       Past Medical History:   Diagnosis Date    Apnea, sleep     Daytime sleepiness     Patient denies medical problems     Snoring          Past Surgical History:   Procedure Laterality Date    EYE SURGERY      lasik    VASECTOMY         Family History   Problem Relation Age of Onset    Cancer Mother         breast- met    Heart Disease Maternal Grandmother     Heart Disease Maternal Grandfather     Heart Disease Paternal Grandmother     Heart Disease Paternal Grandfather     Diabetes Neg Hx     Stroke Neg Hx        Social History     Socioeconomic History    Marital status:      Spouse name: Not on file    Number of children: Not on file    Years of education: Not on file    Highest education level: Not on file   Occupational History    Not on file   Tobacco Use    Smoking status: Never    Smokeless tobacco: Never   Vaping Use    Vaping status: Never Used   Substance and Sexual Activity    Alcohol use: Yes     Alcohol/week: 4.2 oz     Types: 7 Shots of liquor per week     Comment: 1drink/night    Drug use: No    Sexual activity: Yes     Partners: Female     Birth control/protection: Surgical     Comment: , 2 daughters, [employer]   Other Topics Concern    Not on file   Social History Narrative    Not on file     Social Drivers of Health     Financial Resource Strain: Not on file   Food Insecurity: Not on file   Transportation Needs: Not on file   Physical Activity: Not on file   Stress: Not on file   Social Connections: Not on file   Intimate Partner Violence: Not on file   Housing Stability: Not on file       Current Outpatient Medications   Medication Sig Dispense Refill    methylPREDNISolone (MEDROL DOSEPAK) 4 MG Tablet Therapy Pack Follow schedule on package instructions. (Patient not taking: Reported on 12/18/2024) 21 Tablet 0    Cholecalciferol (VITAMIN D) 125 MCG (5000 UT) Cap Take 1 Capsule by mouth every day. (Patient not taking: Reported on 12/18/2024)      Fexofenadine-Pseudoephedrine (ALLEGRA-D 12 HOUR PO) Take 1 Tablet by mouth every 12 hours. (Patient not taking: Reported on 12/18/2024)       No current  "facility-administered medications for this visit.        ALLERGIES: Patient has no known allergies.    ROS  Constitutional: Denies fevers, Denies weight changes  Ears/Nose/Throat/Mouth: Denies nasal congestion or sore throat   Cardiovascular: Denies chest pain  Respiratory: Denies shortness of breath, Denies cough  Gastrointestinal/Hepatic: Denies nausea, vomiting  Sleep: see HPI      PHYSICAL EXAM  /72 (BP Location: Left arm, Patient Position: Sitting, BP Cuff Size: Adult)   Pulse 74   Resp 16   Ht 1.803 m (5' 11\")   Wt 107 kg (235 lb)   SpO2 95%   BMI 32.78 kg/m²   Appearance: Well-nourished, well-developed, no acute distress  Eyes:  No scleral icterus , EOMI  Musculoskeletal:  Grossly normal; gait and station normal; digits and nails normal  Skin:  No rashes, petechiae, cyanosis  Neurologic: without focal signs; oriented to person, time, place, and purpose; judgement intact      Medical Decision Making   Assessment and Plan  Etrisukhi Polanco is a 44 y.o.male  with seasonal allergies, obesity, and severe obstructive sleep apnea on CPAP.  Presents today to follow-up regarding management of obstructive sleep apnea.    The medical record was reviewed.    Obstructive sleep apnea  Compliance data reviewed showing 93% usage > 4hours in last 30  days. Average AHI 3.3 events/hour. Pt continues to use and benefit from machine.      Current Settings Auto CPAP 9-14    PLAN:   -Order placed for mask and supplies   -Advised to reach out via MyChart with questions     Has been advised to continue the current CPAP, clean equipment frequently, and get new mask and supplies as allowed by insurance and DME. Recommend an earlier appointment, if significant treatment barriers develop.    Patients with ABEBE are at increased risk of cardiovascular disease including coronary artery disease, systemic arterial hypertension, pulmonary arterial hypertension, cardiac arrythmias, and stroke. The patient was advised to avoid driving " a motor vehicle when drowsy.      Return in about 1 year (around 12/18/2025).      Please note portions of this record was created using voice recognition software. I have made every reasonable attempt to correct obvious errors, but I expect that there are errors of grammar and possibly content I did not discover before finalizing the note.

## 2025-01-16 ENCOUNTER — OFFICE VISIT (OUTPATIENT)
Dept: URGENT CARE | Facility: CLINIC | Age: 45
End: 2025-01-16
Payer: OTHER GOVERNMENT

## 2025-01-16 VITALS
DIASTOLIC BLOOD PRESSURE: 66 MMHG | SYSTOLIC BLOOD PRESSURE: 134 MMHG | OXYGEN SATURATION: 97 % | HEIGHT: 71 IN | RESPIRATION RATE: 16 BRPM | HEART RATE: 78 BPM | WEIGHT: 235 LBS | BODY MASS INDEX: 32.9 KG/M2 | TEMPERATURE: 98 F

## 2025-01-16 DIAGNOSIS — Z20.828 EXPOSURE TO INFLUENZA: ICD-10-CM

## 2025-01-16 DIAGNOSIS — G44.83 COUGH HEADACHE: ICD-10-CM

## 2025-01-16 DIAGNOSIS — J02.9 SORE THROAT: ICD-10-CM

## 2025-01-16 LAB
FLUAV RNA SPEC QL NAA+PROBE: NEGATIVE
FLUBV RNA SPEC QL NAA+PROBE: NEGATIVE
RSV RNA SPEC QL NAA+PROBE: NEGATIVE
S PYO DNA SPEC NAA+PROBE: NOT DETECTED
SARS-COV-2 RNA RESP QL NAA+PROBE: NEGATIVE

## 2025-01-16 PROCEDURE — 3078F DIAST BP <80 MM HG: CPT | Performed by: PHYSICIAN ASSISTANT

## 2025-01-16 PROCEDURE — 87651 STREP A DNA AMP PROBE: CPT | Performed by: PHYSICIAN ASSISTANT

## 2025-01-16 PROCEDURE — 3075F SYST BP GE 130 - 139MM HG: CPT | Performed by: PHYSICIAN ASSISTANT

## 2025-01-16 PROCEDURE — 99213 OFFICE O/P EST LOW 20 MIN: CPT | Performed by: PHYSICIAN ASSISTANT

## 2025-01-16 PROCEDURE — 0241U POCT CEPHEID COV-2, FLU A/B, RSV - PCR: CPT | Performed by: PHYSICIAN ASSISTANT

## 2025-01-16 RX ORDER — OSELTAMIVIR PHOSPHATE 75 MG/1
75 CAPSULE ORAL 2 TIMES DAILY
Qty: 10 CAPSULE | Refills: 0 | Status: SHIPPED | OUTPATIENT
Start: 2025-01-16 | End: 2025-01-21

## 2025-01-16 ASSESSMENT — ENCOUNTER SYMPTOMS
SORE THROAT: 1
NECK PAIN: 0
EYE REDNESS: 0
COUGH: 1
WHEEZING: 0
DIARRHEA: 0
MYALGIAS: 0
DIZZINESS: 0
EYE DISCHARGE: 0
SPUTUM PRODUCTION: 0
HEADACHES: 1
FEVER: 0
VOMITING: 0

## 2025-01-16 ASSESSMENT — FIBROSIS 4 INDEX: FIB4 SCORE: 0.85

## 2025-01-16 NOTE — PROGRESS NOTES
"Subjective     Etrik Brendan Polanco is a 44 y.o. male who presents with Cough (Patient coming in for cough, ear aches, runny nose and sore throat x 2 days )            Patient is a pleasant 44-year-old male with 1 to 2 days of slight cough, sore throat and runny nose.  Patient reports bilateral ear ache as well.  Patient brought in his daughter who is with flulike symptoms and thought he should have evaluation as well.  Patient does have history of sleep apnea currently on CPAP without difficulty no home O2.  Patient currently is not taking anything for symptoms at this time.  Patient did not get a flu shot this year.    Cough  Associated symptoms include ear pain, headaches and a sore throat. Pertinent negatives include no chest pain, eye redness, fever, myalgias, rash or wheezing.       Review of Systems   Constitutional:  Negative for fever and malaise/fatigue.   HENT:  Positive for ear pain and sore throat. Negative for congestion and ear discharge.    Eyes:  Negative for discharge and redness.   Respiratory:  Positive for cough. Negative for sputum production and wheezing.    Cardiovascular:  Negative for chest pain and leg swelling.   Gastrointestinal:  Negative for diarrhea and vomiting.   Genitourinary:  Negative for dysuria and urgency.   Musculoskeletal:  Negative for myalgias and neck pain.   Skin:  Negative for itching and rash.   Neurological:  Positive for headaches. Negative for dizziness.              Objective     /66   Pulse 78   Temp 36.7 °C (98 °F)   Resp 16   Ht 1.803 m (5' 11\")   Wt 107 kg (235 lb)   SpO2 97%   BMI 32.78 kg/m²    PMH:  has a past medical history of Apnea, sleep, Daytime sleepiness, Patient denies medical problems, and Snoring.    He has no past medical history of Allergy or ASTHMA.  MEDS: Reviewed .   ALLERGIES: No Known Allergies  SURGHX:   Past Surgical History:   Procedure Laterality Date    EYE SURGERY      lasik    VASECTOMY       SOCHX:  reports that he has never " smoked. He has never used smokeless tobacco. He reports current alcohol use of about 4.2 oz of alcohol per week. He reports that he does not use drugs.  FH: Family history was reviewed, no pertinent findings to report    Physical Exam  Vitals reviewed.   Constitutional:       Appearance: Normal appearance. He is well-developed.   HENT:      Head: Normocephalic and atraumatic.      Ears:      Comments: Bilateral clear middle ear effusions.     Nose:      Comments: Rhinorrhea noted.     Mouth/Throat:      Comments: Posterior oropharynx is erythematous, positive postnasal drainage.  No evidence of exudate.  Eyes:      Conjunctiva/sclera: Conjunctivae normal.      Pupils: Pupils are equal, round, and reactive to light.   Cardiovascular:      Rate and Rhythm: Normal rate and regular rhythm.      Heart sounds: No murmur heard.  Pulmonary:      Effort: Pulmonary effort is normal. No respiratory distress.      Breath sounds: Normal breath sounds.   Musculoskeletal:         General: Normal range of motion.      Cervical back: Normal range of motion and neck supple.      Right lower leg: No edema.      Left lower leg: No edema.   Lymphadenopathy:      Cervical: No cervical adenopathy.   Skin:     General: Skin is warm.      Findings: No rash.   Neurological:      Mental Status: He is alert and oriented to person, place, and time.   Psychiatric:         Mood and Affect: Mood normal.         Behavior: Behavior normal.         Thought Content: Thought content normal.                             Assessment & Plan        Assessment & Plan  Sore throat    Orders:    POCT Cepheid Group A Strep - PCR    Cough headache    Orders:    POCT Cepheid CoV-2, Flu A/B, RSV - PCR    Exposure to influenza    Orders:    oseltamivir (TAMIFLU) 75 MG Cap; Take 1 Capsule by mouth 2 times a day for 5 days.              It was explained today that due to the viral nature of the pt's illness, we will treat symptomatically today.  COVID, RSV, flu along  with strep all negative in clinic however patient does have household exposure-his daughter who tested positive for influenza A in clinic.  I will send Tamiflu to the pharmacy and patient will begin such based on guidelines discussed via MyChart.  No evidence of secondary bacterial infection on exam today.  Encouraged OTC supportive meds PRN. Humidification, increase fluids, avoid night time dairy.   Discussed side effects of OTC meds and any prescribed.  Given precautionary s/sx that mandate immediate follow up and evaluation in the ED. Advised of risks of not doing so.    DDX, Supportive care, and indications for immediate follow-up discussed with patient.    Instructed to return to clinic or nearest emergency department if we are not available for any change in condition, further concerns, or worsening of symptoms.    The patient  and/or guardian demonstrated a good understanding and agreed with the treatment plan.    Please note that this dictation was created using voice recognition software. I have made every reasonable attempt to correct obvious errors, but I expect that there are errors of grammar and possibly content that I did not discover before finalizing the note.

## 2025-02-19 ENCOUNTER — HOSPITAL ENCOUNTER (OUTPATIENT)
Facility: MEDICAL CENTER | Age: 45
End: 2025-02-19
Attending: PHYSICIAN ASSISTANT
Payer: OTHER GOVERNMENT

## 2025-02-19 DIAGNOSIS — Z15.09 BRCA POSITIVE: ICD-10-CM

## 2025-02-19 DIAGNOSIS — Z15.01 BRCA POSITIVE: ICD-10-CM

## 2025-02-19 DIAGNOSIS — E78.2 MIXED HYPERLIPIDEMIA: ICD-10-CM

## 2025-02-19 DIAGNOSIS — Z12.5 SCREENING PSA (PROSTATE SPECIFIC ANTIGEN): ICD-10-CM

## 2025-02-19 DIAGNOSIS — R73.03 PREDIABETES: ICD-10-CM

## 2025-02-19 LAB
CHOLEST SERPL-MCNC: 204 MG/DL (ref 100–199)
EST. AVERAGE GLUCOSE BLD GHB EST-MCNC: 123 MG/DL
FASTING STATUS PATIENT QL REPORTED: NORMAL
HBA1C MFR BLD: 5.9 % (ref 4–5.6)
HDLC SERPL-MCNC: 41 MG/DL
LDLC SERPL CALC-MCNC: 117 MG/DL
PSA SERPL DL<=0.01 NG/ML-MCNC: 0.37 NG/ML (ref 0–4)
TRIGL SERPL-MCNC: 229 MG/DL (ref 0–149)

## 2025-02-19 PROCEDURE — 83036 HEMOGLOBIN GLYCOSYLATED A1C: CPT

## 2025-02-19 PROCEDURE — 80061 LIPID PANEL: CPT

## 2025-02-19 PROCEDURE — 36415 COLL VENOUS BLD VENIPUNCTURE: CPT

## 2025-02-19 PROCEDURE — 84153 ASSAY OF PSA TOTAL: CPT

## 2025-02-20 ENCOUNTER — RESULTS FOLLOW-UP (OUTPATIENT)
Dept: MEDICAL GROUP | Facility: MEDICAL CENTER | Age: 45
End: 2025-02-20
Payer: OTHER GOVERNMENT

## 2025-03-04 ENCOUNTER — APPOINTMENT (OUTPATIENT)
Dept: MEDICAL GROUP | Facility: MEDICAL CENTER | Age: 45
End: 2025-03-04
Payer: OTHER GOVERNMENT

## 2025-03-04 VITALS
HEART RATE: 70 BPM | SYSTOLIC BLOOD PRESSURE: 112 MMHG | OXYGEN SATURATION: 94 % | HEIGHT: 71 IN | DIASTOLIC BLOOD PRESSURE: 74 MMHG | BODY MASS INDEX: 34.66 KG/M2 | TEMPERATURE: 97.4 F | WEIGHT: 247.6 LBS

## 2025-03-04 DIAGNOSIS — G44.209 ACUTE NON INTRACTABLE TENSION-TYPE HEADACHE: ICD-10-CM

## 2025-03-04 DIAGNOSIS — R73.03 PREDIABETES: ICD-10-CM

## 2025-03-04 DIAGNOSIS — M25.512 ACUTE PAIN OF LEFT SHOULDER: ICD-10-CM

## 2025-03-04 DIAGNOSIS — Z23 NEED FOR VACCINATION FOR STREP PNEUMONIAE: ICD-10-CM

## 2025-03-04 DIAGNOSIS — J30.2 SEASONAL ALLERGIES: ICD-10-CM

## 2025-03-04 DIAGNOSIS — J45.990 EXERCISE-INDUCED ASTHMA: ICD-10-CM

## 2025-03-04 DIAGNOSIS — E78.2 MIXED HYPERLIPIDEMIA: ICD-10-CM

## 2025-03-04 PROBLEM — J32.1 FRONTAL SINUSITIS: Status: RESOLVED | Noted: 2021-09-24 | Resolved: 2025-03-04

## 2025-03-04 PROCEDURE — 99214 OFFICE O/P EST MOD 30 MIN: CPT | Performed by: PHYSICIAN ASSISTANT

## 2025-03-04 PROCEDURE — 3074F SYST BP LT 130 MM HG: CPT | Performed by: PHYSICIAN ASSISTANT

## 2025-03-04 PROCEDURE — 3078F DIAST BP <80 MM HG: CPT | Performed by: PHYSICIAN ASSISTANT

## 2025-03-04 RX ORDER — CHOLECALCIFEROL (VITAMIN D3) 25 MCG
TABLET,CHEWABLE ORAL
COMMUNITY

## 2025-03-04 RX ORDER — MULTIVIT WITH MINERALS/LUTEIN
1 TABLET ORAL DAILY
COMMUNITY

## 2025-03-04 RX ORDER — FEXOFENADINE HCL 180 MG/1
180 TABLET ORAL DAILY
COMMUNITY

## 2025-03-04 ASSESSMENT — PATIENT HEALTH QUESTIONNAIRE - PHQ9: CLINICAL INTERPRETATION OF PHQ2 SCORE: 0

## 2025-03-04 ASSESSMENT — FIBROSIS 4 INDEX: FIB4 SCORE: 0.85

## 2025-03-04 NOTE — ASSESSMENT & PLAN NOTE
This is a pleasant 44-year-old male here today to follow-up.  He complains of over the past 3 months having headaches they will be every few weeks.  They will last for about an hour.  He finds that going into a dark room and resting causes them to dissipate.  They located on the side of his head around the temples denies any photophobia.  No auras.  No nausea or vomiting.  Believes they may be migraines.  Does not take any medications for the headaches.  He states he is under stress at work.    He recently performed labs.  A1c was stable at 5.9.  Cholesterol level was stable as well.  His shoulder on the left side is doing much better.  He states he is using CPAP nightly and finds it very helpful for daytime symptoms.  He is hoping to get 1 for travel.  He is using his rescue Hailer as needed for exercise-induced asthma.  Does not need a renewal.  He does take Allegra daily for his seasonal allergies and is finding that very effective.

## 2025-03-04 NOTE — PROGRESS NOTES
Subjective:   Brandon Polanco is a 44 y.o. male here today for headache and follow-up.    Acute non intractable tension-type headache  This is a pleasant 44-year-old male here today to follow-up.  He complains of over the past 3 months having headaches they will be every few weeks.  They will last for about an hour.  He finds that going into a dark room and resting causes them to dissipate.  They located on the side of his head around the temples denies any photophobia.  No auras.  No nausea or vomiting.  Believes they may be migraines.  Does not take any medications for the headaches.  He states he is under stress at work.    He recently performed labs.  A1c was stable at 5.9.  Cholesterol level was stable as well.  His shoulder on the left side is doing much better.  He states he is using CPAP nightly and finds it very helpful for daytime symptoms.  He is hoping to get 1 for travel.  He is using his rescue Hailer as needed for exercise-induced asthma.  Does not need a renewal.  He does take Allegra daily for his seasonal allergies and is finding that very effective.       Current medicines (including changes today)  Current Outpatient Medications   Medication Sig Dispense Refill    fexofenadine (ALLEGRA ALLERGY) 180 MG tablet Take 180 mg by mouth every day.      multivitamin Tab Take 1 Tablet by mouth every day.      Cholecalciferol (VITAMIN D3) 125 MCG (5000 UT) Cap Take 1 Capsule by mouth every day.      Cyanocobalamin (B-12) 3000 MCG Cap Take  by mouth.      Ascorbic Acid (VITAMIN C) 1000 MG Tab Take 1 Tablet by mouth every day.      pneumococcal 20-Kady Conj Vacc (PREVNAR 20) 0.5 ML Suspension Prefilled Syringe syringe Inject 0.5 mL into the shoulder, thigh, or buttocks one time for 1 dose. 1 Each 0    pneumococcal 20-Kady Conj Vacc (PREVNAR 20) 0.5 ML Suspension Prefilled Syringe syringe Inject 0.5 mL into the shoulder, thigh, or buttocks one time for 1 dose. 1 Each 0     No current facility-administered  "medications for this visit.     He  has a past medical history of Apnea, sleep, Daytime sleepiness, Patient denies medical problems, and Snoring.    He has no past medical history of Allergy or ASTHMA.    Social History and Family History were reviewed and updated.    ROS   No chest pain, no shortness of breath, no abdominal pain and all other systems were reviewed and are negative.       Objective:     /74 (BP Location: Left arm, Patient Position: Sitting, BP Cuff Size: Adult)   Pulse 70   Temp 36.3 °C (97.4 °F) (Temporal)   Ht 1.803 m (5' 11\")   Wt 112 kg (247 lb 9.6 oz)   SpO2 94%  Body mass index is 34.53 kg/m².   Physical Exam:  Constitutional: Alert, no distress.  Skin: Warm, dry, good turgor, no rashes in visible areas.  Eye: Equal, round and reactive, conjunctiva clear, lids normal.  ENMT: Lips without lesions, good dentition, oropharynx clear.  Neck: Trachea midline, no masses.   Psych: Alert and oriented x3, normal affect and mood.        Assessment and Plan:   The following treatment plan was discussed    1. Acute non intractable tension-type headache  Acute, new onset condition.  Headache appears to be secondary to tension headaches.  They are alleviated with relaxing.  He has no signs of migraines such as auras or nausea or vomiting.  Discussed possible use of Excedrin Migraine.  He states that his work does allow him to alleviate the headaches if needed.    2. Seasonal allergies  Chronic condition.  Stable.  Continue Allegra daily.    3. Prediabetes  Chronic condition.  Stable.  Discussed recent weight gain.  I see him in October we will order labs for follow-up.    4. Mixed hyperlipidemia  Chronic condition.  Stable.  Continue lifestyle modifications.    5. Exercise-induced asthma  Chronic condition.  Stable.  Discussed contacting me through [employer] if renewal of risk inhaler as needed.  Also discussed need for Prevnar 20.  Sent a prescription and also printed out a prescription for him to " take to the pharmacy.  - pneumococcal 20-Kady Conj Vacc (PREVNAR 20) 0.5 ML Suspension Prefilled Syringe syringe; Inject 0.5 mL into the shoulder, thigh, or buttocks one time for 1 dose.  Dispense: 1 Each; Refill: 0  - pneumococcal 20-Kady Conj Vacc (PREVNAR 20) 0.5 ML Suspension Prefilled Syringe syringe; Inject 0.5 mL into the shoulder, thigh, or buttocks one time for 1 dose.  Dispense: 1 Each; Refill: 0    6. Need for vaccination for Strep pneumoniae  Secondary to exercise-induced asthma.  Please see above.  - pneumococcal 20-Kady Conj Vacc (PREVNAR 20) 0.5 ML Suspension Prefilled Syringe syringe; Inject 0.5 mL into the shoulder, thigh, or buttocks one time for 1 dose.  Dispense: 1 Each; Refill: 0  - pneumococcal 20-Kady Conj Vacc (PREVNAR 20) 0.5 ML Suspension Prefilled Syringe syringe; Inject 0.5 mL into the shoulder, thigh, or buttocks one time for 1 dose.  Dispense: 1 Each; Refill: 0    7. Acute pain of left shoulder  Chronic condition.  Stable.  Will continue to monitor.         Followup: Return in about 6 months (around 9/4/2025), or if symptoms worsen or fail to improve.    Please note that this dictation was created using voice recognition software. I have made every reasonable attempt to correct obvious errors, but I expect that there are errors of grammar and possibly content that I did not discover before finalizing the note.

## 2025-04-09 ENCOUNTER — APPOINTMENT (OUTPATIENT)
Facility: MEDICAL CENTER | Age: 45
End: 2025-04-09
Payer: OTHER GOVERNMENT

## 2025-07-01 ENCOUNTER — OFFICE VISIT (OUTPATIENT)
Dept: URGENT CARE | Facility: CLINIC | Age: 45
End: 2025-07-01
Payer: OTHER GOVERNMENT

## 2025-07-01 ENCOUNTER — RESULTS FOLLOW-UP (OUTPATIENT)
Dept: URGENT CARE | Facility: CLINIC | Age: 45
End: 2025-07-01

## 2025-07-01 VITALS
DIASTOLIC BLOOD PRESSURE: 76 MMHG | WEIGHT: 247 LBS | BODY MASS INDEX: 34.58 KG/M2 | RESPIRATION RATE: 16 BRPM | HEIGHT: 71 IN | SYSTOLIC BLOOD PRESSURE: 124 MMHG | OXYGEN SATURATION: 96 % | TEMPERATURE: 97.8 F | HEART RATE: 78 BPM

## 2025-07-01 DIAGNOSIS — Z20.818 EXPOSURE TO STREPTOCOCCAL PHARYNGITIS: ICD-10-CM

## 2025-07-01 DIAGNOSIS — J02.9 EXUDATIVE PHARYNGITIS: ICD-10-CM

## 2025-07-01 DIAGNOSIS — J02.9 SORE THROAT: Primary | ICD-10-CM

## 2025-07-01 LAB — S PYO DNA SPEC NAA+PROBE: DETECTED

## 2025-07-01 PROCEDURE — 3078F DIAST BP <80 MM HG: CPT | Performed by: PHYSICIAN ASSISTANT

## 2025-07-01 PROCEDURE — 99213 OFFICE O/P EST LOW 20 MIN: CPT | Performed by: PHYSICIAN ASSISTANT

## 2025-07-01 PROCEDURE — 3074F SYST BP LT 130 MM HG: CPT | Performed by: PHYSICIAN ASSISTANT

## 2025-07-01 PROCEDURE — 87651 STREP A DNA AMP PROBE: CPT | Performed by: PHYSICIAN ASSISTANT

## 2025-07-01 RX ORDER — AMOXICILLIN 500 MG/1
500 CAPSULE ORAL 2 TIMES DAILY
Qty: 20 CAPSULE | Refills: 0 | Status: SHIPPED | OUTPATIENT
Start: 2025-07-01 | End: 2025-07-11

## 2025-07-01 ASSESSMENT — FIBROSIS 4 INDEX: FIB4 SCORE: 0.85

## 2025-07-01 NOTE — PROGRESS NOTES
"Subjective:     Verbal consent was acquired by the patient to use [employer] ambient listening note generation during this visit     Brandon Polanco is a 44 y.o. male who presents for Sore Throat (Patient exposure for strep x 1 week )       History of Present Illness  The patient presents for evaluation of a sore throat.    He has been experiencing a sore throat for the past week, which has recently improved to the point where he can walk around again. Accompanying symptoms included fever, nausea, and vomiting, but no cough or congestion. Currently, he reports that the stuffiness has subsided, but he continues to cough up dark tan chunks. His wife was recently diagnosed with strep throat.        Medications:  B-12 Caps  fexofenadine  multivitamin Tabs  Vitamin C Tabs  vitamin D3 Caps    Allergies:             Patient has no known allergies.    Past Social Hx:  Brandon Polanco  reports that he has never smoked. He has never used smokeless tobacco. He reports current alcohol use of about 4.2 oz of alcohol per week. He reports that he does not use drugs.           Problem list, medications, and allergies reviewed by myself today in Epic.     Objective:     /76   Pulse 78   Temp 36.6 °C (97.8 °F)   Resp 16   Ht 1.803 m (5' 11\")   Wt 112 kg (247 lb)   SpO2 96%   BMI 34.45 kg/m²     Physical Exam  Vitals and nursing note reviewed.   Constitutional:       General: He is not in acute distress.     Appearance: Normal appearance. He is ill-appearing. He is not toxic-appearing or diaphoretic.   HENT:      Right Ear: Tympanic membrane and external ear normal.      Left Ear: Tympanic membrane and external ear normal.      Nose: Nose normal.      Mouth/Throat:      Lips: Pink.      Mouth: Mucous membranes are moist. No oral lesions or angioedema.      Palate: No lesions.      Pharynx: Uvula midline. Oropharyngeal exudate and posterior oropharyngeal erythema present. No uvula swelling.      Tonsils: Tonsillar " exudate present. No tonsillar abscesses. 2+ on the right. 2+ on the left.      Comments: Tonsillar erythema with trace hypertrophy and mild tonsillar exudate  No evidence of peritonsillar abscess  Voice non-muffled  Uvula midline  Normal phonation  Eyes:      Conjunctiva/sclera: Conjunctivae normal.   Cardiovascular:      Rate and Rhythm: Normal rate and regular rhythm.      Pulses: Normal pulses.      Heart sounds: Normal heart sounds.   Pulmonary:      Effort: Pulmonary effort is normal. No respiratory distress.      Breath sounds: Normal breath sounds. No stridor. No wheezing or rhonchi.   Abdominal:      Tenderness: There is no abdominal tenderness.   Musculoskeletal:      Cervical back: No rigidity.   Lymphadenopathy:      Cervical: Cervical adenopathy present.   Skin:     Findings: No rash.   Neurological:      Mental Status: He is alert.         Physical Exam  Mouth/Throat: Tonsils are red.  Neck: Tender lymph nodes are present.  Respiratory: Clear to auscultation, no wheezing, rales or rhonchi      Results for orders placed or performed in visit on 07/01/25   POCT Cepheid Group A Strep - PCR    Collection Time: 07/01/25  1:29 PM   Result Value Ref Range    POC Group A Strep, PCR Detected (A) Not Detected, Invalid     With that Whats[employer]hard[employer] wanted to see said something biting her radius had    Assessment/Plan:     Diagnosis and Associated Orders:     1. Sore throat  - POCT Cepheid Group A Strep - PCR    2. Exudative pharyngitis  - amoxicillin (AMOXIL) 500 MG Cap; Take 1 Capsule by mouth 2 times a day for 10 days.  Dispense: 20 Capsule; Refill: 0    3. Exposure to Streptococcal pharyngitis  - amoxicillin (AMOXIL) 500 MG Cap; Take 1 Capsule by mouth 2 times a day for 10 days.  Dispense: 20 Capsule; Refill: 0        Medical Decision Making:    Pleasant 44 y.o. presents to clinic with:    Assessment & Plan  Strep pharyngitis  -Throw a toothbrush in 2 to 3 days time to avoid reinfection.  Salt water gargles,  Tylenol/ibuprofen as needed for pain  - Prescribed amoxicillin; advised to complete full 10-day course.   - Vital signs stable and reassuring, no respiratory distress     I personally reviewed prior external notes and test results pertinent to today's visit. Patient is clinically stable at today's urgent care visit.  Patient appears nontoxic with no acute distress noted. Appropriate for outpatient care at this time.  Return to clinic or go to ED if symptoms worsen or persist.  Red flag symptoms discussed.  Patient/Parent/Guardian voices understanding.   All side effects of medication discussed including allergic response, GI upset, tendon injury, rash, sedation etc    Please note that this dictation was created using voice recognition software. I have made a reasonable attempt to correct obvious errors, but I expect that there are errors of grammar and possibly content that I did not discover before finalizing the note.    This note was electronically signed by Sallie Mtz PA-C

## 2025-07-21 ENCOUNTER — OFFICE VISIT (OUTPATIENT)
Dept: URGENT CARE | Facility: CLINIC | Age: 45
End: 2025-07-21
Payer: OTHER GOVERNMENT

## 2025-07-21 VITALS
OXYGEN SATURATION: 96 % | HEIGHT: 71 IN | HEART RATE: 76 BPM | RESPIRATION RATE: 16 BRPM | WEIGHT: 247 LBS | BODY MASS INDEX: 34.58 KG/M2 | DIASTOLIC BLOOD PRESSURE: 76 MMHG | TEMPERATURE: 98.7 F | SYSTOLIC BLOOD PRESSURE: 132 MMHG

## 2025-07-21 DIAGNOSIS — L27.0 ALLERGIC DRUG RASH: Primary | ICD-10-CM

## 2025-07-21 PROCEDURE — 99213 OFFICE O/P EST LOW 20 MIN: CPT

## 2025-07-21 PROCEDURE — 3078F DIAST BP <80 MM HG: CPT

## 2025-07-21 PROCEDURE — 3075F SYST BP GE 130 - 139MM HG: CPT

## 2025-07-21 RX ORDER — METHYLPREDNISOLONE 4 MG/1
TABLET ORAL
Qty: 21 TABLET | Refills: 0 | Status: SHIPPED | OUTPATIENT
Start: 2025-07-21

## 2025-07-21 ASSESSMENT — ENCOUNTER SYMPTOMS
FEVER: 0
NAUSEA: 0
SHORTNESS OF BREATH: 0
VOMITING: 0

## 2025-07-21 ASSESSMENT — FIBROSIS 4 INDEX: FIB4 SCORE: 0.85

## 2025-07-21 NOTE — PROGRESS NOTES
Subjective:     CHIEF COMPLAINT  Chief Complaint   Patient presents with    Rash     Patient coming in for rash located all over body after taking amoxicillin, redness and burning        HPI  Brandon Polanco is a very pleasant 44 y.o. male who presents with development of a full-body rash after taking amoxicillin for the treatment of strep pharyngitis.  The patient was seen in urgent care on 7/1/2025 and was prescribed a 10-day course of amoxicillin for strep pharyngitis.  The patient was not aware that he had an allergy to amoxicillin.  He does report that he experienced a similar rash 2 years ago after taking amoxicillin, but did not realize the rash was due to an allergy.  He completed the full 10-day course of amoxicillin and reports that the rash seemed to worsen towards the end of the course.  He reports that the rash has been intermittently itchy.  He denies any shortness of breath nausea, or vomiting.  No one in his household has a similar rash.  He has not tried any over-the-counter medications such as Benadryl at this time.          REVIEW OF SYSTEMS  Review of Systems   Constitutional:  Negative for fever.   Respiratory:  Negative for shortness of breath.    Gastrointestinal:  Negative for nausea and vomiting.   Skin:  Positive for itching and rash.       PAST MEDICAL HISTORY  Patient Active Problem List    Diagnosis Date Noted    Acute non intractable tension-type headache 03/04/2025    Acute pain of left shoulder 10/18/2024    BRCA positive 09/03/2024    Mixed hyperlipidemia 09/03/2024    Rash 06/25/2024    Agatston coronary artery calcium score less than 100 05/28/2024    ABEBE (obstructive sleep apnea) 05/24/2024    Seasonal allergies 04/26/2024    Hypovitaminosis D 04/26/2024    Prediabetes 11/22/2021    Obesity (BMI 30-39.9) 09/24/2021    Family history of breast cancer in mother 06/29/2018    FHx: BRCA2 gene positive 06/29/2018       SURGICAL HISTORY   has a past surgical history that includes eye  "surgery and vasectomy.    ALLERGIES  Allergies[1]    CURRENT MEDICATIONS  Home Medications       Reviewed by Elena Foster P.A.-C. (Physician Assistant) on 07/21/25 at 1315  Med List Status: <None>     Medication Last Dose Status   Ascorbic Acid (VITAMIN C) 1000 MG Tab Taking Active   Cholecalciferol (VITAMIN D3) 125 MCG (5000 UT) Cap Taking Active   Cyanocobalamin (B-12) 3000 MCG Cap Taking Active   fexofenadine (ALLEGRA ALLERGY) 180 MG tablet Taking Active   multivitamin Tab Taking Active                    SOCIAL HISTORY  Social History     Tobacco Use    Smoking status: Never    Smokeless tobacco: Never   Vaping Use    Vaping status: Never Used   Substance and Sexual Activity    Alcohol use: Yes     Alcohol/week: 4.2 oz     Types: 7 Shots of liquor per week     Comment: 1drink/night    Drug use: No    Sexual activity: Yes     Partners: Female     Birth control/protection: Surgical     Comment: , 2 daughters, [employer]       FAMILY HISTORY  Family History   Problem Relation Age of Onset    Cancer Mother         breast- met    Heart Disease Maternal Grandmother     Heart Disease Maternal Grandfather     Heart Disease Paternal Grandmother     Heart Disease Paternal Grandfather     Diabetes Neg Hx     Stroke Neg Hx           Objective:     VITAL SIGNS: /76   Pulse 76   Temp 37.1 °C (98.7 °F)   Resp 16   Ht 1.803 m (5' 11\")   Wt 112 kg (247 lb)   SpO2 96%   BMI 34.45 kg/m²     PHYSICAL EXAM  Physical Exam  Vitals reviewed.   Constitutional:       General: He is not in acute distress.     Appearance: Normal appearance. He is not ill-appearing or toxic-appearing.   HENT:      Head: Normocephalic and atraumatic.      Mouth/Throat:      Lips: No lesions.      Mouth: Mucous membranes are moist. No angioedema.   Eyes:      Conjunctiva/sclera: Conjunctivae normal.      Pupils: Pupils are equal, round, and reactive to light.   Cardiovascular:      Rate and Rhythm: Normal rate.   Pulmonary:      Effort: " Pulmonary effort is normal. No respiratory distress.   Skin:     General: Skin is warm and dry.      Comments: Scattered erythematous papules becoming confluent on chest and back.  Rash present on trunk, arms, and legs.  Rash blanches and less than 2 seconds.  Mild overlying scaling.   Neurological:      General: No focal deficit present.      Mental Status: He is alert and oriented to person, place, and time.   Psychiatric:         Mood and Affect: Mood normal.         Assessment/Plan:     1. Allergic drug rash  - methylPREDNISolone (MEDROL DOSEPAK) 4 MG Tablet Therapy Pack; Follow schedule on package instructions.  Dispense: 21 Tablet; Refill: 0  - Avoid use of amoxicillin in the future  - Claritin, Zyrtec, or Allegra to be taken OTC  - Monitor symptoms and return to clinic if symptoms worsen or fail to resolve    MDM/Comments:  Patient has stable vital signs and is non-toxic appearing.  Patient is experiencing an allergic drug rash secondary to amoxicillin.  Amoxicillin has been added to the patient's allergy list.  Given diffuse nature of rash, patient has been provided a Medrol Dosepak.  Discussed risks of medication such as increased blood pressure, increased blood sugar, irritability, difficulty sleeping.  Patient demonstrated understanding and was agreeable to use.  Discussed use of OTC antihistamine such as Allegra.  There is no evidence of anaphylaxis or worsening allergic reaction.  Patient demonstrated understanding of treatment plan at this time and will RTC if symptoms worsen or fail to resolve.     Differential diagnosis, natural history, supportive care, and indications for immediate follow-up discussed. All questions answered. Patient agrees with the plan of care.    Follow-up as needed if symptoms worsen or fail to improve to PCP, Urgent care or Emergency Room.    I have personally reviewed prior external notes and test results pertinent to today's visit.  I have independently reviewed and  interpreted all diagnostics ordered during this urgent care acute visit.   Discussed management options (risks,benefits, and alternatives to treatment). Pt expresses understanding and the treatment plan was agreed upon. Questions were encouraged and answered to pt's satisfaction.    Please note that this dictation was created using voice recognition software. I have made a reasonable attempt to correct obvious errors, but I expect that there are errors of grammar and possibly content that I did not discover before finalizing the note.           [1]   Allergies  Allergen Reactions    Amoxicillin Hives    Penicillins Hives